# Patient Record
Sex: MALE | Race: WHITE | NOT HISPANIC OR LATINO | Employment: UNEMPLOYED | URBAN - METROPOLITAN AREA
[De-identification: names, ages, dates, MRNs, and addresses within clinical notes are randomized per-mention and may not be internally consistent; named-entity substitution may affect disease eponyms.]

---

## 2020-01-06 ENCOUNTER — OFFICE VISIT (OUTPATIENT)
Dept: ENDOCRINOLOGY | Facility: CLINIC | Age: 50
End: 2020-01-06

## 2020-01-06 VITALS
DIASTOLIC BLOOD PRESSURE: 84 MMHG | BODY MASS INDEX: 38.12 KG/M2 | OXYGEN SATURATION: 98 % | SYSTOLIC BLOOD PRESSURE: 122 MMHG | HEART RATE: 65 BPM | WEIGHT: 297 LBS | HEIGHT: 74 IN

## 2020-01-06 DIAGNOSIS — E11.9 TYPE 2 DIABETES MELLITUS WITHOUT COMPLICATION, WITHOUT LONG-TERM CURRENT USE OF INSULIN (HCC): Primary | ICD-10-CM

## 2020-01-06 DIAGNOSIS — E89.0 POSTABLATIVE HYPOTHYROIDISM: ICD-10-CM

## 2020-01-06 DIAGNOSIS — E04.2 MULTIPLE THYROID NODULES: ICD-10-CM

## 2020-01-06 PROBLEM — M19.90 ARTHRITIS: Status: ACTIVE | Noted: 2017-10-11

## 2020-01-06 PROBLEM — E78.2 MIXED HYPERLIPIDEMIA: Status: ACTIVE | Noted: 2020-01-06

## 2020-01-06 PROBLEM — I10 HYPERTENSIVE DISORDER: Status: ACTIVE | Noted: 2017-10-11

## 2020-01-06 LAB
T4 FREE SERPL-MCNC: 1.3 NG/DL (ref 0.93–1.7)
TSH SERPL DL<=0.05 MIU/L-ACNC: 0.14 UIU/ML (ref 0.27–4.2)

## 2020-01-06 PROCEDURE — 84443 ASSAY THYROID STIM HORMONE: CPT | Performed by: INTERNAL MEDICINE

## 2020-01-06 PROCEDURE — 99214 OFFICE O/P EST MOD 30 MIN: CPT | Performed by: INTERNAL MEDICINE

## 2020-01-06 PROCEDURE — 84439 ASSAY OF FREE THYROXINE: CPT | Performed by: INTERNAL MEDICINE

## 2020-01-06 RX ORDER — SIMVASTATIN 40 MG
TABLET ORAL
COMMUNITY
End: 2020-06-18 | Stop reason: SDUPTHER

## 2020-01-06 RX ORDER — METFORMIN HYDROCHLORIDE 500 MG/1
1 TABLET, EXTENDED RELEASE ORAL 2 TIMES DAILY
COMMUNITY
End: 2020-01-06 | Stop reason: SDUPTHER

## 2020-01-06 RX ORDER — LISINOPRIL 10 MG/1
TABLET ORAL
COMMUNITY

## 2020-01-06 RX ORDER — METOPROLOL SUCCINATE 100 MG/1
TABLET, EXTENDED RELEASE ORAL
COMMUNITY

## 2020-01-06 RX ORDER — GABAPENTIN 300 MG/1
CAPSULE ORAL 2 TIMES DAILY
COMMUNITY

## 2020-01-06 RX ORDER — DICLOFENAC SODIUM 75 MG/1
75 TABLET, DELAYED RELEASE ORAL 2 TIMES DAILY PRN
COMMUNITY
Start: 2019-11-27

## 2020-01-06 RX ORDER — METFORMIN HYDROCHLORIDE 500 MG/1
500 TABLET, EXTENDED RELEASE ORAL 2 TIMES DAILY
Qty: 180 TABLET | Refills: 3 | Status: SHIPPED | OUTPATIENT
Start: 2020-01-06 | End: 2020-06-18 | Stop reason: SDUPTHER

## 2020-01-06 RX ORDER — OMEPRAZOLE 20 MG/1
20 CAPSULE, DELAYED RELEASE ORAL DAILY
COMMUNITY
End: 2020-06-18 | Stop reason: SDUPTHER

## 2020-01-06 RX ORDER — QUETIAPINE 150 MG/1
150 TABLET, FILM COATED, EXTENDED RELEASE ORAL EVERY EVENING
COMMUNITY
Start: 2019-12-27

## 2020-01-06 RX ORDER — LEVOTHYROXINE SODIUM 0.07 MG/1
75 TABLET ORAL DAILY
COMMUNITY
Start: 2019-12-13 | End: 2020-01-07 | Stop reason: SDUPTHER

## 2020-01-06 RX ORDER — DULOXETIN HYDROCHLORIDE 30 MG/1
CAPSULE, DELAYED RELEASE ORAL DAILY
COMMUNITY
Start: 2019-12-31 | End: 2022-04-18

## 2020-01-06 NOTE — PROGRESS NOTES
Chief Complaint   Patient presents with   • Thyroid Problem     Previous pt here today for follow up   • Diabetes     Last A1C done with PCP in November 6.1        HPI   Cameron Courtney is a 49 y.o. male had concerns including Thyroid Problem (Previous pt here today for follow up) and Diabetes (Last A1C done with PCP in November 6.1).    He is monitoring blood sugars 1-2 times per week.  All BG is 100s.  Current medications for diabetes include metformin 500 mg twice daily.    PCP checked A1c in November and it was 6.1.    He has a history of hyperthyroidism and underwent radioactive iodine ablation in 2018.  Records have been requested.  He is now on levothyroxine 75 mcg daily and is due for repeat labs today.    Patient has a thyroid nodule which has been biopsied and most recently was benign.  Last thyroid ultrasound he thinks was in June.    The following portions of the patient's history were reviewed and updated as appropriate: allergies, current medications, past family history, past medical history, past social history, past surgical history and problem list.    Review of Systems   Constitutional: Positive for chills, fatigue and unexpected weight gain.   HENT: Positive for sore throat. Negative for congestion.    Eyes: Positive for pain, redness and itching.   Respiratory: Positive for shortness of breath. Negative for cough.    Cardiovascular: Positive for palpitations and leg swelling.   Gastrointestinal: Positive for abdominal pain, constipation, nausea, vomiting, GERD and indigestion.   Endocrine: Negative.    Genitourinary: Positive for dysuria, flank pain, urgency and urinary incontinence.   Musculoskeletal: Positive for arthralgias, back pain, joint swelling and myalgias.   Skin: Positive for dry skin. Negative for rash.   Allergic/Immunologic: Negative.    Neurological: Positive for dizziness, weakness, numbness and headache.   Hematological: Negative.    Psychiatric/Behavioral: Positive for  "decreased concentration, sleep disturbance, depressed mood and stress. The patient is nervous/anxious.       ROS was reviewed and verified. All other ROS negative.    /84 (BP Location: Left arm, Patient Position: Sitting, Cuff Size: Adult)   Pulse 65   Ht 188 cm (74\")   Wt 135 kg (297 lb)   SpO2 98%   BMI 38.13 kg/m²      Physical Exam     Constitutional:  well developed; well nourished  no acute distress   ENT/Thyroid: Mild thyromegaly, no distinctly palpable nodules   Eyes: EOM intact  Conjunctiva: clear   Respiratory:  breathing is unlabored  clear to auscultation bilaterally   Cardiovascular:  regular rate and rhythm, S1, S2 normal, no murmur, click, rub or gallop   Chest:  Not performed.   Abdomen: Not performed.   : Not performed.   Musculoskeletal: negative findings:  ROM of all joints is normal, no deformities present   Skin: dry and warm   Neuro: normal without focal findings, mental status, speech normal, alert and oriented x3 and RAFAEL   Psych: oriented to time, place and person, mood and affect are within normal limits       Assessment and Plan    Cameron was seen today for thyroid problem and diabetes.    Diagnoses and all orders for this visit:    Type 2 diabetes mellitus without complication, without long-term current use of insulin (CMS/[facility])  Controlled with A1c 6.1.  PCP monitoring A1c routinely.    Continue metformin 500 mg twice daily.  Labs are up-to-date yearly.  Ophtho exam should be updated yearly.   -     metFORMIN ER (GLUCOPHAGE-XR) 500 MG 24 hr tablet; Take 1 tablet by mouth 2 (Two) Times a Day.    Postablative hypothyroidism  Clinically euthyroid on levothyroxine 75 mcg daily.  Check TFTs today.  -     TSH  -     T4, Free    Multiple thyroid nodules  History of benign FNA.  Records from prior office including past ultrasound and FNA results have been requested.  Thyroid ultrasound due at follow-up.       Return in about 5 months (around 6/6/2020) for next scheduled follow up " with thyroid ultrasouond. The patient was instructed to contact the clinic with any interval questions or concerns.    Juli Ely, DO   Endocrinologist    Please note that portions of this document were completed with a voice recognition program. Efforts were made to edit the dictations, but occasionally words are mis-transcribed.

## 2020-01-07 DIAGNOSIS — E89.0 POSTABLATIVE HYPOTHYROIDISM: Primary | ICD-10-CM

## 2020-01-07 RX ORDER — LEVOTHYROXINE SODIUM 0.07 MG/1
TABLET ORAL
Qty: 14 TABLET | Refills: 5 | Status: SHIPPED | OUTPATIENT
Start: 2020-01-07 | End: 2020-06-18 | Stop reason: SDUPTHER

## 2020-01-07 RX ORDER — LEVOTHYROXINE SODIUM 0.05 MG/1
TABLET ORAL
Qty: 18 TABLET | Refills: 5 | Status: SHIPPED | OUTPATIENT
Start: 2020-01-07 | End: 2020-06-18 | Stop reason: SDUPTHER

## 2020-06-18 ENCOUNTER — OFFICE VISIT (OUTPATIENT)
Dept: ENDOCRINOLOGY | Facility: CLINIC | Age: 50
End: 2020-06-18

## 2020-06-18 VITALS
HEART RATE: 71 BPM | SYSTOLIC BLOOD PRESSURE: 118 MMHG | BODY MASS INDEX: 38.2 KG/M2 | WEIGHT: 297.7 LBS | DIASTOLIC BLOOD PRESSURE: 82 MMHG | OXYGEN SATURATION: 98 % | HEIGHT: 74 IN

## 2020-06-18 DIAGNOSIS — E11.9 TYPE 2 DIABETES MELLITUS WITHOUT COMPLICATION, WITHOUT LONG-TERM CURRENT USE OF INSULIN (HCC): Primary | ICD-10-CM

## 2020-06-18 DIAGNOSIS — E89.0 POSTABLATIVE HYPOTHYROIDISM: ICD-10-CM

## 2020-06-18 DIAGNOSIS — E78.2 MIXED HYPERLIPIDEMIA: ICD-10-CM

## 2020-06-18 DIAGNOSIS — E04.1 SOLITARY THYROID NODULE: ICD-10-CM

## 2020-06-18 LAB
GLUCOSE BLDC GLUCOMTR-MCNC: 183 MG/DL (ref 70–130)
HBA1C MFR BLD: 7.6 %

## 2020-06-18 PROCEDURE — 82947 ASSAY GLUCOSE BLOOD QUANT: CPT | Performed by: INTERNAL MEDICINE

## 2020-06-18 PROCEDURE — 99214 OFFICE O/P EST MOD 30 MIN: CPT | Performed by: INTERNAL MEDICINE

## 2020-06-18 PROCEDURE — 76536 US EXAM OF HEAD AND NECK: CPT | Performed by: INTERNAL MEDICINE

## 2020-06-18 PROCEDURE — 10005 FNA BX W/US GDN 1ST LES: CPT | Performed by: INTERNAL MEDICINE

## 2020-06-18 PROCEDURE — 83036 HEMOGLOBIN GLYCOSYLATED A1C: CPT | Performed by: INTERNAL MEDICINE

## 2020-06-18 RX ORDER — SIMVASTATIN 40 MG
40 TABLET ORAL DAILY
Qty: 90 TABLET | Refills: 1 | Status: SHIPPED | OUTPATIENT
Start: 2020-06-18 | End: 2020-09-21 | Stop reason: SDUPTHER

## 2020-06-18 RX ORDER — LEVOTHYROXINE SODIUM 0.07 MG/1
TABLET ORAL
Qty: 42 TABLET | Refills: 3 | Status: SHIPPED | OUTPATIENT
Start: 2020-06-18 | End: 2021-03-11

## 2020-06-18 RX ORDER — LEVOTHYROXINE SODIUM 0.05 MG/1
TABLET ORAL
Qty: 54 TABLET | Refills: 3 | Status: SHIPPED | OUTPATIENT
Start: 2020-06-18 | End: 2021-03-11

## 2020-06-18 RX ORDER — METFORMIN HYDROCHLORIDE 500 MG/1
500-1000 TABLET, EXTENDED RELEASE ORAL 2 TIMES DAILY
Qty: 270 TABLET | Refills: 3 | Status: SHIPPED | OUTPATIENT
Start: 2020-06-18 | End: 2020-06-22 | Stop reason: ALTCHOICE

## 2020-06-18 RX ORDER — NICOTINE POLACRILEX 4 MG/1
1 GUM, CHEWING ORAL EVERY MORNING
COMMUNITY
Start: 2020-06-05

## 2020-06-18 NOTE — PROGRESS NOTES
Chief Complaint   Patient presents with   • Diabetes     Type 2 Diabetes   • Thyroid Problem     + US        HPI   Cameron Courtney is a 49 y.o. male had concerns including Diabetes (Type 2 Diabetes) and Thyroid Problem (+ US).    He is checking blood sugars once every 2 to 3 weeks.  Reports BG's have been in the 100s.  His A1c has trended up to 7.6 from 6.1.  Patient has been eating too many sweets (ice cream).  Currently he is on metformin 500 mg twice daily.  Has not tolerated maximal doses of metformin in the past but is willing to try 3 tabs per day.    After his last visit I changed his levothyroxine dose to 75 mcg 3 times per week and 50 mcg 4 times per week.  He had the repeat labs drawn however we did not receive the report.  PCP checked labs recently and these will be requested.    Patient had thyroid ablation for hyperthyroidism about a year ago.    Last thyroid ultrasound was completed on 5/30/2019 and showed the left lobe nodule measuring 1.4 x 1.4 x 1.1 cm when the nodule appeared solid with no calcifications of some seen in the anterior thyroid.  The nodule had previously decreased in size and had a cystic component.  Repeat ultrasound was completed in the office today.    The following portions of the patient's history were reviewed and updated as appropriate: allergies, current medications, past family history, past medical history, past social history, past surgical history and problem list.    Review of Systems   Constitutional: Negative.    HENT: Negative.    Eyes: Negative.    Respiratory: Negative.    Cardiovascular: Negative.    Gastrointestinal: Negative.    Endocrine: Negative.    Genitourinary: Negative.    Musculoskeletal: Negative.    Skin: Negative.    Allergic/Immunologic: Negative.    Neurological: Negative.    Hematological: Negative.    Psychiatric/Behavioral: Negative.       ROS was reviewed and verified. All other ROS negative.    /82 (BP Location: Left arm, Patient Position:  "Sitting, Cuff Size: Large Adult)   Pulse 71   Ht 188 cm (74\")   Wt 135 kg (297 lb 11.2 oz)   SpO2 98%   BMI 38.22 kg/m²      Physical Exam     Constitutional:  well developed; well nourished  no acute distress  obese - Body mass index is 38.22 kg/m².   ENT/Thyroid: no thyromegaly  no palpable nodules   Eyes: EOM intact  Conjunctiva: clear   Respiratory:  breathing is unlabored  clear to auscultation bilaterally   Cardiovascular:  regular rate and rhythm, S1, S2 normal, no murmur, click, rub or gallop   Chest:  Not performed.   Abdomen: Not performed.   : Not performed.   Musculoskeletal: negative findings:  ROM of all joints is normal, no deformities present   Skin: dry and warm   Neuro: normal without focal findings and mental status, speech normal, alert and oriented x3   Psych: oriented to time, place and person, mood and affect are within normal limits     HbA1c:  Lab Results   Component Value Date    HGBA1C 7.6 06/18/2020     Glucose:    Lab Results   Component Value Date    POCGLU 183 (A) 06/18/2020     TSH:  Lab Results   Component Value Date    TSH 0.145 (L) 01/06/2020       Thyroid Ultrasound 06/18/20    Indication: Thyroid nodule  Comparison Imaging: Ultrasound report from May 2019  Clinical History:  Left thyroid nodule with prior benign FNA    Real time high resolution imaging of the thyroid gland was performed in transverse and longitudinal planes. Previous imaging reports were reviewed if available and compared to the current to assess stability.     Lobes:  The right lobe measured 3.0 cm L x 1.3 cm AP x 1.2 cm in TV dimension.    The isthmus measured 0.1 cm in thickness.    The left thyroid lobe measured 3.3 cm L x 2.0 cm AP x 1.8 cm in TV dimension.    Thyroid gland is heterogeneous and contains single left nodule.    Nodule 1   located in the left mid lobe and measures 2.1 x 1.6 x 1.7 cm (L X AP X TV).  This nodule is solid, heterogeneous, hypoechoic with poorly defined margins, and Grade II " vascularity on Color Flow Doppler.  It has both macro and microcalcifications.    No pathologic lymph nodes were seen.    Impression:  Left thyroid nodule has increased in size to 2.1 x 1.6 x 1.7 cm from last ultrasound in May 2019 when it measured 1.4 x 1.4 x 1.1 cm.  The remainder of the thyroid is diminutive and hypoechoic, consistent with history of radioactive iodine treatment for Graves' disease.    Recommendation:  Repeat FNA of the left thyroid nodule.      Thyroid Biopsy Procedure Note      Indications: Thyroid Nodule    Anesthesia: Ethyl chloride spray    Procedure Details   The procedure, risks and complications were discussed in detail with the patient (including but not limited to infection, bleeding), and the patient signed consent for the procedure.    The neck was prepped in sterile fashion.  Biopsy site: left 2.1 cm nodule.  With ultrasound guidance of needle localization,   4  aspirates were obtained with sterile 27 g. needles.  8 slides were prepared with both air drying and immediate cytology fixative.    A single container with 20 ml of cytology fixative was also used to collect needle and syringe washings.   Specimens were sent to pathology.   An additional specimen was collected for molecular analysis and will be sent in the event of indeterminate biopsy.     The patient tolerated the procedure without difficulty or complications.           Assessment and Plan    Cameron was seen today for diabetes and thyroid problem.    Diagnoses and all orders for this visit:    Type 2 diabetes mellitus without complication, without long-term current use of insulin (CMS/[facility])  Uncontrolled with A1c up to 7.6.  Patient reports this is due to dietary indiscretion.  Increase metformin to 3 tabs daily.  Patient has not tolerated 4 tabs daily in the past.  He also indicates he will change his diet to improve BG's.  Check BG several times per week.  Labs updated by PCP and these will be requested.  Ophtho exam is  scheduled 2 weeks from now.  Monofilament will be checked at follow-up visit.  -     POC Glucose  -     POC Glycosylated Hemoglobin (Hb A1C)  -     metFORMIN ER (GLUCOPHAGE-XR) 500 MG 24 hr tablet; Take 1-2 tablets by mouth 2 (Two) Times a Day. 1 tab AM and 2 tabs PM    Solitary thyroid nodule  See ultrasound report as above.  Left lobe nodule has increased in size to 2.1 cm since prior ultrasound. FNA completed in the office today.  Await pathology results.  -     US Thyroid    Postablative hypothyroidism  Status post GALLARDO ablation a few years ago for Graves' disease.  Clinically euthyroid on levothyroxine 50 mcg 4 days/week and 75 mcg 3 days/week.  Request labs checked by PCP recently.  -     levothyroxine (Synthroid) 50 MCG tablet; Take 75 mcg tabs 3 days per week and 50 mcg tabs 4 days per week  -     levothyroxine (SYNTHROID, LEVOTHROID) 75 MCG tablet; Take 75 mcg tabs 3 days per week and 50 mcg tabs 4 days per week    Mixed hyperlipidemia  Request labs from PCP.  Refill of simvastatin sent.  -     simvastatin (ZOCOR) 40 MG tablet; Take 1 tablet by mouth Daily.         **Addendum 6/22/2020:    FNA results showed atypical follicular lesion of undetermined significance.  I notified the patient and discussed option for Afirma molecular analysis vs. endocrine surgery consultation and he prefers endocrine surgery consultation.  Referral will be placed.    Return in about 3 months (around 9/18/2020). The patient was instructed to contact the clinic with any interval questions or concerns.    Juli Ely, DO   Endocrinologist    Please note that portions of this document were completed with a voice recognition program. Efforts were made to edit the dictations, but occasionally words are mis-transcribed.

## 2020-06-22 ENCOUNTER — TELEPHONE (OUTPATIENT)
Dept: ENDOCRINOLOGY | Facility: CLINIC | Age: 50
End: 2020-06-22

## 2020-06-22 DIAGNOSIS — E11.9 TYPE 2 DIABETES MELLITUS WITHOUT COMPLICATION, WITHOUT LONG-TERM CURRENT USE OF INSULIN (HCC): Primary | ICD-10-CM

## 2020-06-22 NOTE — TELEPHONE ENCOUNTER
Pharmacy sent fax stating Metformin was recalled - they would like a new script for Metformin  mg or plain 500 mg. Please advise. Thanks!

## 2020-07-28 ENCOUNTER — TELEPHONE (OUTPATIENT)
Dept: ENDOCRINOLOGY | Facility: CLINIC | Age: 50
End: 2020-07-28

## 2020-07-28 NOTE — TELEPHONE ENCOUNTER
----- Message from Juli Ely DO sent at 6/18/2020 12:04 PM EDT -----  Regarding: labs from PCP  Please request labs from last year from PCP Thank you!

## 2020-09-21 ENCOUNTER — OFFICE VISIT (OUTPATIENT)
Dept: ENDOCRINOLOGY | Facility: CLINIC | Age: 50
End: 2020-09-21

## 2020-09-21 VITALS
WEIGHT: 292.8 LBS | HEIGHT: 74 IN | HEART RATE: 63 BPM | TEMPERATURE: 96.7 F | OXYGEN SATURATION: 98 % | BODY MASS INDEX: 37.58 KG/M2 | RESPIRATION RATE: 16 BRPM | SYSTOLIC BLOOD PRESSURE: 124 MMHG | DIASTOLIC BLOOD PRESSURE: 82 MMHG

## 2020-09-21 DIAGNOSIS — E66.01 CLASS 2 SEVERE OBESITY DUE TO EXCESS CALORIES WITH SERIOUS COMORBIDITY AND BODY MASS INDEX (BMI) OF 37.0 TO 37.9 IN ADULT (HCC): ICD-10-CM

## 2020-09-21 DIAGNOSIS — E78.2 MIXED HYPERLIPIDEMIA: ICD-10-CM

## 2020-09-21 DIAGNOSIS — E11.69 TYPE 2 DIABETES MELLITUS WITH OTHER SPECIFIED COMPLICATION, WITHOUT LONG-TERM CURRENT USE OF INSULIN (HCC): Primary | ICD-10-CM

## 2020-09-21 DIAGNOSIS — E89.0 POSTOPERATIVE HYPOTHYROIDISM: ICD-10-CM

## 2020-09-21 PROBLEM — E04.2 MULTIPLE THYROID NODULES: Status: RESOLVED | Noted: 2020-01-06 | Resolved: 2020-09-21

## 2020-09-21 PROBLEM — Z98.890 H/O THYROIDECTOMY: Status: ACTIVE | Noted: 2020-09-21

## 2020-09-21 PROBLEM — Z90.89 H/O THYROIDECTOMY: Status: ACTIVE | Noted: 2020-09-21

## 2020-09-21 LAB
GLUCOSE BLDC GLUCOMTR-MCNC: 217 MG/DL (ref 70–130)
HBA1C MFR BLD: 8.5 %

## 2020-09-21 PROCEDURE — 83036 HEMOGLOBIN GLYCOSYLATED A1C: CPT | Performed by: INTERNAL MEDICINE

## 2020-09-21 PROCEDURE — 82947 ASSAY GLUCOSE BLOOD QUANT: CPT | Performed by: INTERNAL MEDICINE

## 2020-09-21 PROCEDURE — 99214 OFFICE O/P EST MOD 30 MIN: CPT | Performed by: INTERNAL MEDICINE

## 2020-09-21 RX ORDER — SIMVASTATIN 40 MG
40 TABLET ORAL DAILY
Qty: 90 TABLET | Refills: 1 | Status: SHIPPED | OUTPATIENT
Start: 2020-09-21 | End: 2021-03-11 | Stop reason: SDUPTHER

## 2020-09-21 NOTE — PROGRESS NOTES
"Chief Complaint   Patient presents with   • Diabetes   • Hypothyroidism        HPI   Cameron Courtney is a 50 y.o. male had concerns including Diabetes and Hypothyroidism.    He is checking blood sugars 0 times per day.  Current medications for diabetes include metformin 3 pills daily.  A1c today is up to 8.5 from 7.6 and BG in the office is 217.  Indicates this is from eating ice cream too many times per week and portions are too large.     He underwent complete thyroidectomy with Dr. Bambi Boykin on 8/4/20. Reports some hypocalcemia issues postoperatively.   Has a telemedicine visit with her this week and labs done at [facility].  Notes more arthralgias since surgery. Takes Tums PRN.     Is on simvastatin 40 mg daily.  We do not have any records from PCP from last labs.  These will again be requested.    The following portions of the patient's history were reviewed and updated as appropriate: allergies, current medications, past family history, past medical history, past social history, past surgical history and problem list.    Review of Systems   Constitutional: Positive for chills and fatigue.   HENT: Negative.    Eyes: Negative.    Respiratory: Negative.    Cardiovascular: Negative.    Gastrointestinal: Negative.    Endocrine: Negative.    Genitourinary: Negative.    Musculoskeletal: Positive for arthralgias and myalgias.   Skin: Negative.    Allergic/Immunologic: Negative.    Neurological: Negative.    Hematological: Negative.    Psychiatric/Behavioral: Negative.       ROS was reviewed and verified. All other ROS negative.    /82   Pulse 63   Temp 96.7 °F (35.9 °C) (Infrared)   Resp 16   Ht 188 cm (74.02\")   Wt 133 kg (292 lb 12.8 oz)   SpO2 98%   BMI 37.58 kg/m²      Physical Exam  Cardiovascular:      Pulses:           Dorsalis pedis pulses are 2+ on the right side and 2+ on the left side.   Feet:      Right foot:      Skin integrity: Skin integrity normal.      Left foot:      Skin " integrity: Skin integrity normal.      Comments: Monofilament 1/5 bilaterally       Constitutional:  well developed; well nourished  no acute distress  obese - Body mass index is 37.58 kg/m².   ENT/Thyroid: surgically absent  no palpable lymphadenopathy   Eyes: EOM intact  Conjunctiva: clear   Respiratory:  breathing is unlabored  clear to auscultation bilaterally   Cardiovascular:  regular rate and rhythm, S1, S2 normal, no murmur, click, rub or gallop   Chest:  Not performed.   Abdomen: Not performed.   : Not performed.   Musculoskeletal: negative findings:  ROM of all joints is normal, no deformities present   Skin: dry and warm   Neuro: normal without focal findings and mental status, speech normal, alert and oriented x3   Psych: oriented to time, place and person, mood and affect are within normal limits     HbA1c:  Lab Results   Component Value Date    HGBA1C 8.5 09/21/2020    HGBA1C 7.6 06/18/2020     Glucose:    Lab Results   Component Value Date    POCGLU 217 (A) 09/21/2020     TSH:  Lab Results   Component Value Date    TSH 0.145 (L) 01/06/2020       Assessment and Plan    Cameron was seen today for diabetes and hypothyroidism.    Diagnoses and all orders for this visit:    Type 2 diabetes mellitus with other complication, without long-term current use of insulin (CMS/[facility])  Uncontrolled with A1c up to 8.5 and glucose in the office 217.  Complicated by neuropathy.  Increase metformin to 2 tabs twice daily from 3 pills total daily.  This may not be enough depending on his ability to make significant dietary changes.  Check BG's twice daily and if BG's are trending greater than 180 consistently, call the office of additional medications will be needed.  Labs have been requested from PCPs office.  Ophtho exam is up-to-date from a few months ago. Monofilament updated today.   -     POC Glucose, Blood  -     POC Glycosylated Hemoglobin (Hb A1C)  -     metFORMIN (Glucophage) 500 MG tablet; Take 2 tablets by  mouth 2 (Two) Times a Day With Meals.    Postoperative hypothyroidism  Status post thyroidectomy August 2020 for thyroid nodule with FNA showing atypical follicular lesion of undetermined significance.  Patient reports final pathology was benign.  He is following with Dr. Malgorzata Boykin.   Currently on levothyroxine 50 mcg 4 days per week and 75 mcg 3 days per week. TFTs were checked last week and he will see Dr. Boykin this week.   Records have been requested.     Mixed hyperlipidemia  Needs CMP and lipid yearly. Records have been requested.   Refill sent.   -     simvastatin (ZOCOR) 40 MG tablet; Take 1 tablet by mouth Daily.    Class 2 severe obesity due to excess calories with serious comorbidity and body mass index (BMI) of 37.0 to 37.9 in adult (CMS/[facility])  BMI 37.6.   Weight loss is necessary. Discussed dietary modifications, decrease carbs and calorie intake with goal for weight loss.        Return in about 4 months (around 1/21/2021) for next scheduled follow up. The patient was instructed to contact the clinic with any interval questions or concerns.    Juli Ely, DO   Endocrinologist    Please note that portions of this document were completed with a voice recognition program. Efforts were made to edit the dictations, but occasionally words are mis-transcribed.

## 2021-03-11 ENCOUNTER — TELEPHONE (OUTPATIENT)
Dept: ENDOCRINOLOGY | Facility: CLINIC | Age: 51
End: 2021-03-11

## 2021-03-11 ENCOUNTER — OFFICE VISIT (OUTPATIENT)
Dept: ENDOCRINOLOGY | Facility: CLINIC | Age: 51
End: 2021-03-11

## 2021-03-11 ENCOUNTER — LAB (OUTPATIENT)
Dept: LAB | Facility: HOSPITAL | Age: 51
End: 2021-03-11

## 2021-03-11 VITALS
HEIGHT: 74 IN | TEMPERATURE: 97.8 F | WEIGHT: 291.6 LBS | SYSTOLIC BLOOD PRESSURE: 128 MMHG | BODY MASS INDEX: 37.42 KG/M2 | DIASTOLIC BLOOD PRESSURE: 80 MMHG

## 2021-03-11 DIAGNOSIS — E89.0 POSTOPERATIVE HYPOTHYROIDISM: ICD-10-CM

## 2021-03-11 DIAGNOSIS — E78.2 MIXED HYPERLIPIDEMIA: ICD-10-CM

## 2021-03-11 DIAGNOSIS — E89.0 POSTABLATIVE HYPOTHYROIDISM: ICD-10-CM

## 2021-03-11 DIAGNOSIS — E11.69 TYPE 2 DIABETES MELLITUS WITH OTHER SPECIFIED COMPLICATION, WITHOUT LONG-TERM CURRENT USE OF INSULIN (HCC): Primary | ICD-10-CM

## 2021-03-11 LAB
ALBUMIN UR-MCNC: 1.3 MG/DL
CREAT UR-MCNC: 214.5 MG/DL
EXPIRATION DATE: NORMAL
HBA1C MFR BLD: 6.4 %
Lab: NORMAL
MICROALBUMIN/CREAT UR: 6.1 MG/G

## 2021-03-11 PROCEDURE — 99214 OFFICE O/P EST MOD 30 MIN: CPT | Performed by: INTERNAL MEDICINE

## 2021-03-11 PROCEDURE — 82043 UR ALBUMIN QUANTITATIVE: CPT | Performed by: INTERNAL MEDICINE

## 2021-03-11 PROCEDURE — 82570 ASSAY OF URINE CREATININE: CPT | Performed by: INTERNAL MEDICINE

## 2021-03-11 PROCEDURE — 83036 HEMOGLOBIN GLYCOSYLATED A1C: CPT | Performed by: INTERNAL MEDICINE

## 2021-03-11 PROCEDURE — 84439 ASSAY OF FREE THYROXINE: CPT | Performed by: INTERNAL MEDICINE

## 2021-03-11 PROCEDURE — 84443 ASSAY THYROID STIM HORMONE: CPT | Performed by: INTERNAL MEDICINE

## 2021-03-11 RX ORDER — LEVOTHYROXINE SODIUM 0.07 MG/1
TABLET ORAL
Qty: 42 TABLET | Refills: 3
Start: 2021-03-11 | End: 2021-03-12 | Stop reason: SDUPTHER

## 2021-03-11 RX ORDER — SIMVASTATIN 40 MG
40 TABLET ORAL DAILY
Qty: 90 TABLET | Refills: 3 | Status: SHIPPED | OUTPATIENT
Start: 2021-03-11 | End: 2021-03-15

## 2021-03-11 RX ORDER — METFORMIN HYDROCHLORIDE 500 MG/1
1000 TABLET, EXTENDED RELEASE ORAL 2 TIMES DAILY
Qty: 360 TABLET | Refills: 3 | Status: SHIPPED | OUTPATIENT
Start: 2021-03-11 | End: 2022-04-18 | Stop reason: SDUPTHER

## 2021-03-11 RX ORDER — LEVOTHYROXINE SODIUM 0.05 MG/1
TABLET ORAL
Qty: 54 TABLET | Refills: 3
Start: 2021-03-11 | End: 2021-03-12

## 2021-03-11 RX ORDER — METFORMIN HYDROCHLORIDE 500 MG/1
2 TABLET, EXTENDED RELEASE ORAL 2 TIMES DAILY
COMMUNITY
Start: 2021-02-22 | End: 2021-03-11 | Stop reason: SDUPTHER

## 2021-03-11 NOTE — PROGRESS NOTES
"Chief Complaint   Patient presents with   • Follow-up   • Thyroid Problem   • Diabetes        HPI   Cameron Courtney is a 50 y.o. male had concerns including Follow-up, Thyroid Problem, and Diabetes.      PCP checked labs about three months ago and TSH was around 10.  He is currently on levothyroxine 50 mcg 2 days/week and 75 mcg 5 days/week.    Pt feels somewhat sluggish.  Denies any other new concerns today.    A1c is improved today to 6.4 from 8.5 after dietary changes and increasing metformin.  He checks his blood sugars 2 times per week and they tend to run in the 120s to 130s.      The following portions of the patient's history were reviewed and updated as appropriate: allergies, current medications, past family history, past medical history, past social history, past surgical history and problem list.    Review of Systems   Constitutional: Positive for fatigue.   Endocrine: Negative.         /80   Temp 97.8 °F (36.6 °C) (Infrared)   Ht 188 cm (74\")   Wt 132 kg (291 lb 9.6 oz)   BMI 37.44 kg/m²      Physical Exam  Vitals reviewed.   Constitutional:       Appearance: Normal appearance. He is obese.   Eyes:      Extraocular Movements: Extraocular movements intact.   Pulmonary:      Effort: Pulmonary effort is normal.   Neurological:      Mental Status: He is alert.   Psychiatric:         Mood and Affect: Mood normal.           Labs/Imaging    HbA1c:  Lab Results   Component Value Date    HGBA1C 6.4 03/11/2021    HGBA1C 8.5 09/21/2020     TSH:  Lab Results   Component Value Date    TSH 0.145 (L) 01/06/2020       Assessment and Plan    Diagnoses and all orders for this visit:    1. Type 2 diabetes mellitus with other specified complication, without long-term current use of insulin (CMS/[facility]) (Primary)  Controlled with A1c down to 6.4 from 8.5 at his last visit.  Complicated by neuropathy.    Continue metformin 2 tabs twice daily.  Labs updated by PCP and will be requested.  Check urine microalbumin to " creatinine ratio today.  Monofilament updated September 2020.  Ophthoexam up-to-date within the last year -summer 2020.  -     POC Glycosylated Hemoglobin (Hb A1C)  -     Microalbumin / Creatinine Urine Ratio - Urine, Clean Catch  -     metFORMIN ER (GLUCOPHAGE-XR) 500 MG 24 hr tablet; Take 2 tablets by mouth 2 (two) times a day.  Dispense: 360 tablet; Refill: 3    2. Postoperative hypothyroidism  Status post complete thyroidectomy 8/4/2020 of atypical lesion of undetermined significance.  Prior to this patient had Graves' disease status post GALLARDO.  Ultimately benign surgical pathology.  TSH has been elevated and patient complains of fatigue.  Recheck TFTs today and titrate levothyroxine.    He is currently on levothyroxine 50 mcg 2 days/week and 75 mcg 5 days/week.  -     TSH  -     T4, Free    3. Mixed hyperlipidemia  Refill simvastatin.  CMP and lipid should be checked yearly.  Request labs from PCP.  -     simvastatin (ZOCOR) 40 MG tablet; Take 1 tablet by mouth Daily.  Dispense: 90 tablet; Refill: 3         Return in about 3 months (around 6/11/2021) for next scheduled follow up. The patient was instructed to contact the clinic with any interval questions or concerns.    Juli Ely, DO   Endocrinologist    Please note that portions of this document were completed with a voice recognition program. Efforts were made to edit the dictations, but occasionally words are mis-transcribed.

## 2021-03-12 DIAGNOSIS — E89.0 POSTABLATIVE HYPOTHYROIDISM: ICD-10-CM

## 2021-03-12 LAB
T4 FREE SERPL-MCNC: 1.1 NG/DL (ref 0.93–1.7)
TSH SERPL DL<=0.05 MIU/L-ACNC: 7.76 UIU/ML (ref 0.27–4.2)

## 2021-03-12 RX ORDER — LEVOTHYROXINE SODIUM 0.07 MG/1
75 TABLET ORAL DAILY
Qty: 90 TABLET | Refills: 1 | Status: SHIPPED | OUTPATIENT
Start: 2021-03-12 | End: 2021-07-08 | Stop reason: SDUPTHER

## 2021-03-15 ENCOUNTER — TELEPHONE (OUTPATIENT)
Dept: ENDOCRINOLOGY | Facility: CLINIC | Age: 51
End: 2021-03-15

## 2021-03-15 DIAGNOSIS — E78.2 MIXED HYPERLIPIDEMIA: Primary | ICD-10-CM

## 2021-03-15 RX ORDER — ATORVASTATIN CALCIUM 40 MG/1
40 TABLET, FILM COATED ORAL DAILY
Qty: 90 TABLET | Refills: 3 | Status: SHIPPED | OUTPATIENT
Start: 2021-03-15 | End: 2021-07-08

## 2021-03-15 NOTE — TELEPHONE ENCOUNTER
Please let the pt know I received his labs and his cholesterol levels are higher than goal. I would like to change him from simvastatin to atorvastatin - which should lower the cholesterol better. I'll send a script to his pharmacy.     Labs from 3/11/2021 CMP with creatinine 1.16, GFR 73, AST 43 with an upper limit of 40, ALT 62 with an upper limit of 44  Labs 12/1/2020 CMP with creatinine 1.14, GFR 74, AST 57, ALT 92, total cholesterol 172, triglycerides 157, HDL 40, , TSH 10.8, random urine albumin 17.6

## 2021-06-16 ENCOUNTER — OFFICE VISIT (OUTPATIENT)
Dept: ENDOCRINOLOGY | Facility: CLINIC | Age: 51
End: 2021-06-16

## 2021-06-16 VITALS
SYSTOLIC BLOOD PRESSURE: 112 MMHG | BODY MASS INDEX: 37.22 KG/M2 | DIASTOLIC BLOOD PRESSURE: 76 MMHG | HEIGHT: 74 IN | HEART RATE: 64 BPM | WEIGHT: 290 LBS

## 2021-06-16 DIAGNOSIS — E87.5 HYPERKALEMIA: ICD-10-CM

## 2021-06-16 DIAGNOSIS — E89.0 POSTABLATIVE HYPOTHYROIDISM: ICD-10-CM

## 2021-06-16 DIAGNOSIS — E78.2 MIXED HYPERLIPIDEMIA: ICD-10-CM

## 2021-06-16 DIAGNOSIS — Z99.89 OSA ON CPAP: ICD-10-CM

## 2021-06-16 DIAGNOSIS — G47.33 OSA ON CPAP: ICD-10-CM

## 2021-06-16 DIAGNOSIS — E89.0 POSTOPERATIVE HYPOTHYROIDISM: ICD-10-CM

## 2021-06-16 DIAGNOSIS — E11.69 TYPE 2 DIABETES MELLITUS WITH OTHER SPECIFIED COMPLICATION, WITHOUT LONG-TERM CURRENT USE OF INSULIN (HCC): Primary | ICD-10-CM

## 2021-06-16 LAB
EXPIRATION DATE: ABNORMAL
EXPIRATION DATE: NORMAL
GLUCOSE BLDC GLUCOMTR-MCNC: 141 MG/DL (ref 70–130)
HBA1C MFR BLD: 7.7 %
Lab: ABNORMAL
Lab: NORMAL

## 2021-06-16 PROCEDURE — 99214 OFFICE O/P EST MOD 30 MIN: CPT | Performed by: INTERNAL MEDICINE

## 2021-06-16 PROCEDURE — 83036 HEMOGLOBIN GLYCOSYLATED A1C: CPT | Performed by: INTERNAL MEDICINE

## 2021-06-16 PROCEDURE — 82947 ASSAY GLUCOSE BLOOD QUANT: CPT | Performed by: INTERNAL MEDICINE

## 2021-06-16 RX ORDER — EMPAGLIFLOZIN 25 MG/1
1 TABLET, FILM COATED ORAL DAILY
Qty: 30 TABLET | Refills: 6 | Status: SHIPPED | OUTPATIENT
Start: 2021-06-16 | End: 2021-09-27 | Stop reason: SDUPTHER

## 2021-06-16 NOTE — PROGRESS NOTES
"Chief Complaint   Patient presents with   • Diabetes   • Hypothyroidism          HPI   Cameron Courtney is a 50 y.o. male had concerns including Diabetes and Hypothyroidism.    He is checking blood sugars 1-2 times per week.   A1c is up to 7.7 from 6.4.     Current medications for diabetes include metformin 1000 mg BID. Takes an extra tab when BGs > 200.  Hasn't been on SGLT-2 inhibitor of GLP-1 agonist in the past.     C/o fatigue and brain fog. Wears CPAP nightly but feels like he isn't entering REM sleep. CPAP is about 10 years old.     The following portions of the patient's history were reviewed and updated as appropriate: allergies, current medications, past family history, past medical history, past social history, past surgical history and problem list.      Review of Systems   Constitutional: Positive for fatigue.   Cardiovascular: Negative for chest pain.   Endocrine:        See HPI   Musculoskeletal: Positive for arthralgias.   Psychiatric/Behavioral: Positive for sleep disturbance.        Physical Exam  Vitals reviewed.   Constitutional:       Appearance: Normal appearance. He is obese.      Comments: Body mass index is 37.23 kg/m².     Cardiovascular:      Rate and Rhythm: Normal rate.   Pulmonary:      Effort: Pulmonary effort is normal.   Neurological:      General: No focal deficit present.      Mental Status: He is alert. Mental status is at baseline.   Psychiatric:         Mood and Affect: Mood normal.         Behavior: Behavior normal.        /76 (BP Location: Right arm, Patient Position: Sitting, Cuff Size: Adult)   Pulse 64   Ht 188 cm (74\")   Wt 132 kg (290 lb)   BMI 37.23 kg/m²      Labs and imaging    HbA1c:  Lab Results   Component Value Date    HGBA1C 7.7 06/16/2021    HGBA1C 6.4 03/11/2021     Glucose:    Lab Results   Component Value Date    POCGLU 141 (A) 06/16/2021     Microalbumin:  Lab Results   Component Value Date    MALBCRERATIO 6.1 03/11/2021     TSH:  Lab Results "   Component Value Date    TSH 7.760 (H) 03/11/2021     Labs from 3/11/2021 CMP with creatinine 1.16, GFR 73, AST 43 with an upper limit of 40, ALT 62 with an upper limit of 44  Labs 12/1/2020 CMP with creatinine 1.14, GFR 74, AST 57, ALT 92, total cholesterol 172, triglycerides 157, HDL 40, , TSH 10.8, random urine albumin 17.6    Assessment and plan  Diagnoses and all orders for this visit:    1. Type 2 diabetes mellitus with other specified complication, without long-term current use of insulin (CMS/[facility]) (Primary)  Uncontrolled with hyperglycemia, A1c up to 7.7 from 6.4 at his last visit.  Due to dietary indiscretion.  Continue metformin 1000 mg twice daily.    Add Jardiance 25 mg daily.  Caution regarding possible UTI/yeast infection symptoms. Recommended GLP-1 agonist for more weight loss though pt declined due to injection and rybelsus appears to not be covered.  Check fasting labs next week.  Ophtho exam is up-to-date from July 2020 and pending appointment next month.  Monofilament updated from September 2020.  -     POC Glycosylated Hemoglobin (Hb A1C)  -     POC Glucose, Blood  -     Microalbumin / Creatinine Urine Ratio - Urine, Clean Catch; Future  -     CBC (No Diff); Future  -     Empagliflozin (Jardiance) 25 MG tablet; Take 25 mg by mouth Daily.  Dispense: 30 tablet; Refill: 6    2. Mixed hyperlipidemia  Changed to atorvastatin after his last visit his LDL was above goal at 103.  Repeat fasting CMP and lipid.  -     Comprehensive Metabolic Panel; Future  -     Lipid Panel; Future    3. Postoperative hypothyroidism  History of Graves' disease status post GALLARDO.  Patient had FNA of thyroid nodule that was atypical status post complete thyroidectomy August 4, 2020 with benign pathology.  Dose of levothyroxine was increased after his last visit for TSH 7.76.  Repeat TFTs and titrate levothyroxine if needed.  -     TSH; Future  -     T4, Free; Future    4. KNERICK on CPAP  KENRICK on CPAP.  Compliant with use.   Thinks machine is 10 years old and not functioning properly.  Complaining of fatigue and brain fog.  Follow-up with Dr. Morales.       Return in about 3 months (around 9/16/2021) for next scheduled follow up. The patient was instructed to contact the clinic with any interval questions or concerns.    Juli Ely, DO   Endocrinologist    Please note that portions of this document were completed with a voice recognition program. Efforts were made to edit the dictations, but occasionally words are mis-transcribed.

## 2021-07-08 ENCOUNTER — TELEPHONE (OUTPATIENT)
Dept: ENDOCRINOLOGY | Facility: CLINIC | Age: 51
End: 2021-07-08

## 2021-07-08 DIAGNOSIS — E87.5 HYPERKALEMIA: ICD-10-CM

## 2021-07-08 DIAGNOSIS — E78.2 MIXED HYPERLIPIDEMIA: Primary | ICD-10-CM

## 2021-07-08 DIAGNOSIS — E89.0 POSTABLATIVE HYPOTHYROIDISM: ICD-10-CM

## 2021-07-08 RX ORDER — ROSUVASTATIN CALCIUM 40 MG/1
40 TABLET, COATED ORAL DAILY
Qty: 90 TABLET | Refills: 2 | Status: SHIPPED | OUTPATIENT
Start: 2021-07-08 | End: 2021-09-27

## 2021-07-08 RX ORDER — LEVOTHYROXINE SODIUM 88 UG/1
88 TABLET ORAL DAILY
Qty: 30 TABLET | Refills: 5 | Status: SHIPPED | OUTPATIENT
Start: 2021-07-08 | End: 2021-09-27 | Stop reason: SDUPTHER

## 2021-07-08 NOTE — TELEPHONE ENCOUNTER
Pt called states had TSH drawn on 07/05/21 level was 6.95 also potassium and sodium was high pt wants to know if her needs to lay off on his Levothyroxine 75 mcg. Pt last seen 06/16/21 pt next appt 09/21/21. Please notify pt

## 2021-09-27 ENCOUNTER — TELEPHONE (OUTPATIENT)
Dept: ENDOCRINOLOGY | Facility: CLINIC | Age: 51
End: 2021-09-27

## 2021-09-27 ENCOUNTER — OFFICE VISIT (OUTPATIENT)
Dept: ENDOCRINOLOGY | Facility: CLINIC | Age: 51
End: 2021-09-27

## 2021-09-27 VITALS
WEIGHT: 282 LBS | OXYGEN SATURATION: 95 % | DIASTOLIC BLOOD PRESSURE: 70 MMHG | BODY MASS INDEX: 36.19 KG/M2 | HEIGHT: 74 IN | HEART RATE: 70 BPM | SYSTOLIC BLOOD PRESSURE: 118 MMHG

## 2021-09-27 DIAGNOSIS — E78.2 MIXED HYPERLIPIDEMIA: ICD-10-CM

## 2021-09-27 DIAGNOSIS — E66.01 CLASS 2 SEVERE OBESITY DUE TO EXCESS CALORIES WITH SERIOUS COMORBIDITY AND BODY MASS INDEX (BMI) OF 36.0 TO 36.9 IN ADULT (HCC): ICD-10-CM

## 2021-09-27 DIAGNOSIS — E11.69 TYPE 2 DIABETES MELLITUS WITH OTHER SPECIFIED COMPLICATION, WITHOUT LONG-TERM CURRENT USE OF INSULIN (HCC): Primary | ICD-10-CM

## 2021-09-27 DIAGNOSIS — E89.0 POSTOPERATIVE HYPOTHYROIDISM: ICD-10-CM

## 2021-09-27 LAB
EXPIRATION DATE: NORMAL
EXPIRATION DATE: NORMAL
GLUCOSE BLDC GLUCOMTR-MCNC: 107 MG/DL (ref 70–130)
HBA1C MFR BLD: 6.9 %
Lab: NORMAL
Lab: NORMAL

## 2021-09-27 PROCEDURE — 83036 HEMOGLOBIN GLYCOSYLATED A1C: CPT | Performed by: INTERNAL MEDICINE

## 2021-09-27 PROCEDURE — 82947 ASSAY GLUCOSE BLOOD QUANT: CPT | Performed by: INTERNAL MEDICINE

## 2021-09-27 PROCEDURE — 99213 OFFICE O/P EST LOW 20 MIN: CPT | Performed by: INTERNAL MEDICINE

## 2021-09-27 RX ORDER — ATORVASTATIN CALCIUM 40 MG/1
40 TABLET, FILM COATED ORAL DAILY
Qty: 90 TABLET | Refills: 3 | Status: SHIPPED | OUTPATIENT
Start: 2021-09-27 | End: 2022-08-25 | Stop reason: SDUPTHER

## 2021-09-27 RX ORDER — OMEPRAZOLE 20 MG/1
20 CAPSULE, DELAYED RELEASE ORAL DAILY
COMMUNITY
Start: 2021-07-05

## 2021-09-27 RX ORDER — LEVOTHYROXINE SODIUM 0.1 MG/1
100 TABLET ORAL DAILY
Qty: 30 TABLET | Refills: 5 | Status: SHIPPED | OUTPATIENT
Start: 2021-09-27 | End: 2022-04-06 | Stop reason: SDUPTHER

## 2022-04-06 DIAGNOSIS — E89.0 POSTOPERATIVE HYPOTHYROIDISM: ICD-10-CM

## 2022-04-06 RX ORDER — LEVOTHYROXINE SODIUM 0.1 MG/1
100 TABLET ORAL DAILY
Qty: 30 TABLET | Refills: 1 | Status: SHIPPED | OUTPATIENT
Start: 2022-04-06 | End: 2022-04-18

## 2022-04-18 ENCOUNTER — LAB (OUTPATIENT)
Dept: LAB | Facility: HOSPITAL | Age: 52
End: 2022-04-18

## 2022-04-18 ENCOUNTER — OFFICE VISIT (OUTPATIENT)
Dept: ENDOCRINOLOGY | Facility: CLINIC | Age: 52
End: 2022-04-18

## 2022-04-18 VITALS
WEIGHT: 284 LBS | OXYGEN SATURATION: 97 % | HEIGHT: 73 IN | DIASTOLIC BLOOD PRESSURE: 72 MMHG | SYSTOLIC BLOOD PRESSURE: 112 MMHG | BODY MASS INDEX: 37.64 KG/M2 | HEART RATE: 73 BPM

## 2022-04-18 DIAGNOSIS — E89.0 POSTOPERATIVE HYPOTHYROIDISM: ICD-10-CM

## 2022-04-18 DIAGNOSIS — E78.2 MIXED HYPERLIPIDEMIA: ICD-10-CM

## 2022-04-18 DIAGNOSIS — E11.69 TYPE 2 DIABETES MELLITUS WITH OTHER SPECIFIED COMPLICATION, WITHOUT LONG-TERM CURRENT USE OF INSULIN: ICD-10-CM

## 2022-04-18 DIAGNOSIS — E11.65 TYPE 2 DIABETES MELLITUS WITH HYPERGLYCEMIA, WITHOUT LONG-TERM CURRENT USE OF INSULIN: ICD-10-CM

## 2022-04-18 LAB
EXPIRATION DATE: ABNORMAL
EXPIRATION DATE: NORMAL
GLUCOSE BLDC GLUCOMTR-MCNC: 257 MG/DL (ref 70–130)
HBA1C MFR BLD: 7.3 %
Lab: ABNORMAL
Lab: NORMAL

## 2022-04-18 PROCEDURE — 82570 ASSAY OF URINE CREATININE: CPT | Performed by: INTERNAL MEDICINE

## 2022-04-18 PROCEDURE — 84443 ASSAY THYROID STIM HORMONE: CPT | Performed by: INTERNAL MEDICINE

## 2022-04-18 PROCEDURE — 3051F HG A1C>EQUAL 7.0%<8.0%: CPT | Performed by: INTERNAL MEDICINE

## 2022-04-18 PROCEDURE — 82947 ASSAY GLUCOSE BLOOD QUANT: CPT | Performed by: INTERNAL MEDICINE

## 2022-04-18 PROCEDURE — 99214 OFFICE O/P EST MOD 30 MIN: CPT | Performed by: INTERNAL MEDICINE

## 2022-04-18 PROCEDURE — 83036 HEMOGLOBIN GLYCOSYLATED A1C: CPT | Performed by: INTERNAL MEDICINE

## 2022-04-18 PROCEDURE — 82043 UR ALBUMIN QUANTITATIVE: CPT | Performed by: INTERNAL MEDICINE

## 2022-04-18 RX ORDER — TAMSULOSIN HYDROCHLORIDE 0.4 MG/1
1 CAPSULE ORAL EVERY EVENING
COMMUNITY
Start: 2022-04-03

## 2022-04-18 RX ORDER — LEVOTHYROXINE SODIUM 112 UG/1
112 TABLET ORAL DAILY
COMMUNITY
Start: 2022-04-07 | End: 2022-04-19 | Stop reason: SDUPTHER

## 2022-04-18 RX ORDER — METFORMIN HYDROCHLORIDE 500 MG/1
1000 TABLET, EXTENDED RELEASE ORAL 2 TIMES DAILY
Qty: 360 TABLET | Refills: 1 | Status: SHIPPED | OUTPATIENT
Start: 2022-04-18 | End: 2022-08-25 | Stop reason: SDUPTHER

## 2022-04-18 RX ORDER — DULOXETIN HYDROCHLORIDE 60 MG/1
60 CAPSULE, DELAYED RELEASE ORAL DAILY
COMMUNITY
Start: 2022-04-04

## 2022-04-18 NOTE — PROGRESS NOTES
"Chief Complaint   Patient presents with   • Diabetes          HPI   Cameron Courtney is a 51 y.o. male had concerns including Diabetes.    He is checking blood sugars 0 times per day - ran out of test strips. Was checking only once per week.   Current medications for diabetes include metformin 1000 mg BID and jardiance 25 mg once daily.    BG is 257 today. He had fruity hermes for breakfast.     Dose of ltx was increased since his last visit here.     The following portions of the patient's history were reviewed and updated as appropriate: allergies, current medications, past family history, past medical history, past social history, past surgical history and problem list.      Review of Systems   Constitutional: Positive for fatigue.   Endocrine:        See HPI        Physical Exam  Vitals reviewed.   Constitutional:       Appearance: Normal appearance. He is obese.      Comments: Body mass index is 37.47 kg/m².   Cardiovascular:      Rate and Rhythm: Normal rate.   Pulmonary:      Effort: Pulmonary effort is normal.   Neurological:      General: No focal deficit present.      Mental Status: He is alert. Mental status is at baseline.   Psychiatric:         Mood and Affect: Mood normal.         Behavior: Behavior normal.        /72   Pulse 73   Ht 185.4 cm (73\")   Wt 129 kg (284 lb)   SpO2 97%   BMI 37.47 kg/m²      Labs and imaging    HbA1c:  Lab Results   Component Value Date    HGBA1C 7.3 04/18/2022    HGBA1C 6.9 09/27/2021     Glucose:    Lab Results   Component Value Date    POCGLU 257 (A) 04/18/2022     Microalbumin:  Lab Results   Component Value Date    MALBCRERATIO 6.1 03/11/2021     TSH:  Lab Results   Component Value Date    TSH 7.760 (H) 03/11/2021     Labs 12/1/2020 CMP with creatinine 1.14, GFR 74, AST 57, ALT 92, total cholesterol 172, triglycerides 157, HDL 40, , TSH 10.8, random urine albumin 17.6  Labs from 3/11/2021 CMP with creatinine 1.16, GFR 73, AST 43 with an upper limit of " 40, ALT 62 with an upper limit of 44     7/5/2021 TSH 6.95, free T4 1.19, CMP with glucose 121, creatinine 1.2, GFR 70, potassium 5.6, calcium 10.3 with an albumin of 4.6, ALT 54, AST and alkaline phosphatase normal, , HDL 36, triglycerides 142, total cholesterol 169, urine albumin to creatinine ratio normal     8/30/21 TSH 4.98, free T4 1.10, Cr 1.06, GFR > 60     Assessment and plan  Diagnoses and all orders for this visit:    1. Type 2 diabetes mellitus with hyperglycemia, without long-term current use of insulin (HCC) (Primary)  Uncontrolled with hyperglycemia.  A1c up to 7.3.  Due to dietary indiscretion.  Complicated by neuropathy, also due to spinal issues.  Continue metformin 1000 mg twice daily and Jardiance 25 mg daily.  He has declined GLP-1 agonist in the past.  Dietary modifications are necessary.  He needs to check his glucose levels more consistently.  Call the office if persistently elevated.  Labs are up-to-date from July.  Urine microalbumin normal in July.  Ophtho exam up-to-date from August and asked the patient to have the report sent here.  Monofilament up-to-date from September.  -     POC Glucose, Blood  -     POC Glycosylated Hemoglobin (Hb A1C)  -     Microalbumin / Creatinine Urine Ratio - Urine, Clean Catch  -     empagliflozin (Jardiance) 25 MG tablet tablet; Take 1 tablet by mouth Daily.  Dispense: 90 tablet; Refill: 1  -     metFORMIN ER (GLUCOPHAGE-XR) 500 MG 24 hr tablet; Take 2 tablets by mouth 2 (Two) Times a Day.  Dispense: 360 tablet; Refill: 1  -     glucose blood test strip; check glucose daily  Dispense: 100 each; Refill: 3    2. Postoperative hypothyroidism  Status post GALLARDO for Graves' disease and underwent thyroidectomy 8/4/2020 for atypical thyroid nodule.  Final pathology was benign.  Complaining of fatigue.  Clinically euthyroid on levothyroxine 112 mcg daily.  PCP increased in January.  We will have reviewed those labs.  Recheck TFTs today and titrate  levothyroxine if able for TSH in the lower range of normal.  -TSH    3. Mixed hyperlipidemia  Lipids last checked in July.  Monitor yearly.  Continue atorvastatin 40 mg daily.  Patient has a history of intolerance to rosuvastatin.      Return in about 4 months (around 8/18/2022) for next scheduled follow up. The patient was instructed to contact the clinic with any interval questions or concerns.    Juli Ely, DO   Endocrinologist    Please note that portions of this note were completed with a voice recognition program.

## 2022-04-19 DIAGNOSIS — E11.65 TYPE 2 DIABETES MELLITUS WITH HYPERGLYCEMIA, WITHOUT LONG-TERM CURRENT USE OF INSULIN: Primary | ICD-10-CM

## 2022-04-19 LAB
ALBUMIN UR-MCNC: <1.2 MG/DL
CREAT UR-MCNC: 48.9 MG/DL
MICROALBUMIN/CREAT UR: NORMAL MG/G{CREAT}
TSH SERPL DL<=0.05 MIU/L-ACNC: 4.32 UIU/ML (ref 0.27–4.2)

## 2022-04-19 RX ORDER — LEVOTHYROXINE SODIUM 0.12 MG/1
125 TABLET ORAL DAILY
Qty: 90 TABLET | Refills: 1 | Status: SHIPPED | OUTPATIENT
Start: 2022-04-19 | End: 2022-08-29 | Stop reason: SDUPTHER

## 2022-07-29 NOTE — PROGRESS NOTES
Anesthesia Transfer of Care Note    Patient: Josep Bailey    Procedure(s) Performed: Procedure(s) (LRB):  REPAIR, HERNIA, FEMORAL, NONREDUCIBLE, WITHOUT HISTORY OF PRIOR REPAIR (Right)    Patient location: PACU    Anesthesia Type: general    Transport from OR: Transported from OR on 6-10 L/min O2 by face mask with adequate spontaneous ventilation    Post pain: adequate analgesia    Post assessment: tolerated procedure well and no apparent anesthetic complications    Post vital signs: stable    Level of consciousness: sedated    Nausea/Vomiting: no nausea/vomiting    Complications: none    Transfer of care protocol was followed      Last vitals:   Visit Vitals  BP (!) 92/60   Pulse 77   Temp 36.3 °C (97.3 °F)   Resp 18   Wt 22.8 kg (50 lb 4.2 oz)   SpO2 99%      Chief Complaint   Patient presents with   • Diabetes   • Thyroid Problem          HPI   Cameron Courtney is a 51 y.o. male had concerns including Diabetes and Thyroid Problem.    He is checking blood sugars rarely.  Current medications for diabetes include metformin 1000 mg BID and jardiance 25 mg daily.    In the last few months notes his muscles feel tight, everything feels heavy - even paper.   Was changed from atorvastatin to rosuvastatin after his last visit.     Had labs checked at [facility] 4-5 weeks ago. We didn't receive those. Dose of levothyroxine was increased after last visit for elevated TSH.     Is on levothyroxine 88 mcg daily.     The following portions of the patient's history were reviewed and updated as appropriate: allergies, current medications, past family history, past medical history, past social history, past surgical history and problem list.      Review of Systems   Constitutional: Positive for fatigue.   Musculoskeletal: Positive for myalgias.   Neurological: Positive for weakness.   Psychiatric/Behavioral: Positive for sleep disturbance.        Physical Exam  Vitals reviewed.   Constitutional:       Appearance: Normal appearance. He is obese.      Comments: Body mass index is 36.21 kg/m².   Cardiovascular:      Rate and Rhythm: Normal rate.      Pulses:           Dorsalis pedis pulses are 1+ on the right side and 1+ on the left side.   Pulmonary:      Effort: Pulmonary effort is normal.   Feet:      Right foot:      Skin integrity: Skin integrity normal.      Toenail Condition: Right toenails are normal.      Left foot:      Skin integrity: Skin integrity normal.      Toenail Condition: Left toenails are normal.      Comments: Diabetic Foot Exam Performed and Monofilament Test Performed    Monofilament 1/5 right and 3/5 left, diminished diffusely    Neurological:      General: No focal deficit present.      Mental Status: He is alert. Mental status is at baseline.   Psychiatric:          "Mood and Affect: Mood normal.         Behavior: Behavior normal.        /70   Pulse 70   Ht 188 cm (74\")   Wt 128 kg (282 lb)   SpO2 95%   BMI 36.21 kg/m²      Labs and imaging    HbA1c:  Lab Results   Component Value Date    HGBA1C 6.9 09/27/2021    HGBA1C 7.7 06/16/2021     Glucose:    Lab Results   Component Value Date    POCGLU 107 09/27/2021     Microalbumin:  Lab Results   Component Value Date    MALBCRERATIO 6.1 03/11/2021     TSH:  Lab Results   Component Value Date    TSH 7.760 (H) 03/11/2021     Labs from 3/11/2021 CMP with creatinine 1.16, GFR 73, AST 43 with an upper limit of 40, ALT 62 with an upper limit of 44  Labs 12/1/2020 CMP with creatinine 1.14, GFR 74, AST 57, ALT 92, total cholesterol 172, triglycerides 157, HDL 40, , TSH 10.8, random urine albumin 17.6    7/5/2021 TSH 6.95, free T4 1.19, CMP with glucose 121, creatinine 1.2, GFR 70, potassium 5.6, calcium 10.3 with an albumin of 4.6, ALT 54, AST and alkaline phosphatase normal, , HDL 36, triglycerides 142, total cholesterol 169, urine albumin to creatinine ratio normal    8/30/21 TSH 4.98, free T4 1.10, Cr 1.06, GFR > 60    Assessment and plan  Diagnoses and all orders for this visit:    1. Type 2 diabetes mellitus with other specified complication, without long-term current use of insulin (CMS/[facility]) (Primary)  Controlled with A1c improved to 6.9.  Complicated by neuropathy due in part to back issues.  Continue metformin and Jardiance.  He has not been interested in injectable GLP-1 agonists.  If Rybelsus is covered in the future, consider the addition.  Urine microalbumin normal from July.  All labs up-to-date from July.  Ophtho exam is pending.  Monofilament was updated in the office today.  Recommended he monitor BG several times per week.  -     POC Glycosylated Hemoglobin (Hb A1C)  -     POC Glucose, Blood  -     empagliflozin (Jardiance) 25 MG tablet tablet; Take 1 tablet by mouth Daily.  Dispense: 90 tablet; " Refill: 1    2. Postoperative hypothyroidism  Status post GALLARDO for Graves' disease and underwent thyroidectomy 8/4/2020 for atypical thyroid nodule.  Final pathology was benign.  Complaining of fatigue.  TSH still above goal at 4.98.  Increase levothyroxine to 100 mcg daily and recheck TFTs in 6 weeks.  -     levothyroxine (SYNTHROID, LEVOTHROID) 100 MCG tablet; Take 1 tablet by mouth Daily.  Dispense: 30 tablet; Refill: 5  -     T4, Free; Future  -     TSH; Future    3. Mixed hyperlipidemia  Patient has developed myalgia and weakness since changing to rosuvastatin.  Change back to atorvastatin 40 mg daily.  -     atorvastatin (Lipitor) 40 MG tablet; Take 1 tablet by mouth Daily.  Dispense: 90 tablet; Refill: 3    4. Class 2 severe obesity due to excess calories with serious comorbidity and body mass index (BMI) of 36.0 to 36.9 in adult (HCC)  BMI down to 36.2 with an 8 pound weight loss since his last visit here.  Congratulated on efforts and encouraged to continue.       Return in about 4 months (around 1/27/2022) for next scheduled follow up. The patient was instructed to contact the clinic with any interval questions or concerns.    Juli Ely, DO   Endocrinologist    Please note that portions of this document were completed with a voice recognition program. Efforts were made to edit the dictations, but occasionally words are mis-transcribed.

## 2022-08-19 ENCOUNTER — DOCUMENTATION (OUTPATIENT)
Dept: ENDOCRINOLOGY | Facility: CLINIC | Age: 52
End: 2022-08-19

## 2022-08-19 NOTE — PROGRESS NOTES
Specialty Pharmacy Patient Management Program  Endocrinology Benefits Investigation      Cameron is seen by a [facility] Endocrinology provider and is currently taking a [employer] specialty medication.  The patient IS ELIGIBLE for enrollment in the Endocrinology Patient Management Program offered by [facility].     Insurance: [place] Medicaid  Medication(s) and Costs:   Jardiance $0/90d

## 2022-08-25 ENCOUNTER — SPECIALTY PHARMACY (OUTPATIENT)
Dept: ENDOCRINOLOGY | Facility: CLINIC | Age: 52
End: 2022-08-25

## 2022-08-25 ENCOUNTER — LAB (OUTPATIENT)
Dept: LAB | Facility: HOSPITAL | Age: 52
End: 2022-08-25

## 2022-08-25 ENCOUNTER — OFFICE VISIT (OUTPATIENT)
Dept: ENDOCRINOLOGY | Facility: CLINIC | Age: 52
End: 2022-08-25

## 2022-08-25 VITALS
HEART RATE: 60 BPM | DIASTOLIC BLOOD PRESSURE: 68 MMHG | BODY MASS INDEX: 36.05 KG/M2 | HEIGHT: 73 IN | WEIGHT: 272 LBS | SYSTOLIC BLOOD PRESSURE: 110 MMHG | OXYGEN SATURATION: 96 %

## 2022-08-25 DIAGNOSIS — E78.2 MIXED HYPERLIPIDEMIA: ICD-10-CM

## 2022-08-25 DIAGNOSIS — E11.42 TYPE 2 DIABETES MELLITUS WITH DIABETIC POLYNEUROPATHY, WITHOUT LONG-TERM CURRENT USE OF INSULIN: Primary | ICD-10-CM

## 2022-08-25 DIAGNOSIS — E89.0 POSTOPERATIVE HYPOTHYROIDISM: ICD-10-CM

## 2022-08-25 LAB
ALBUMIN SERPL-MCNC: 4 G/DL (ref 3.5–5.2)
ALBUMIN/GLOB SERPL: 1.5 G/DL
ALP SERPL-CCNC: 80 U/L (ref 39–117)
ALT SERPL W P-5'-P-CCNC: 34 U/L (ref 1–41)
ANION GAP SERPL CALCULATED.3IONS-SCNC: 10.6 MMOL/L (ref 5–15)
AST SERPL-CCNC: 21 U/L (ref 1–40)
BILIRUB SERPL-MCNC: 0.6 MG/DL (ref 0–1.2)
BUN SERPL-MCNC: 19 MG/DL (ref 6–20)
BUN/CREAT SERPL: 17.6 (ref 7–25)
CALCIUM SPEC-SCNC: 9.4 MG/DL (ref 8.6–10.5)
CHLORIDE SERPL-SCNC: 103 MMOL/L (ref 98–107)
CHOLEST SERPL-MCNC: 169 MG/DL (ref 0–200)
CO2 SERPL-SCNC: 25.4 MMOL/L (ref 22–29)
CREAT SERPL-MCNC: 1.08 MG/DL (ref 0.76–1.27)
DEPRECATED RDW RBC AUTO: 38.7 FL (ref 37–54)
EGFRCR SERPLBLD CKD-EPI 2021: 82.6 ML/MIN/1.73
ERYTHROCYTE [DISTWIDTH] IN BLOOD BY AUTOMATED COUNT: 12.6 % (ref 12.3–15.4)
EXPIRATION DATE: NORMAL
EXPIRATION DATE: NORMAL
GLOBULIN UR ELPH-MCNC: 2.6 GM/DL
GLUCOSE BLDC GLUCOMTR-MCNC: 120 MG/DL (ref 70–130)
GLUCOSE SERPL-MCNC: 103 MG/DL (ref 65–99)
HBA1C MFR BLD: 6.8 %
HCT VFR BLD AUTO: 40.1 % (ref 37.5–51)
HDLC SERPL-MCNC: 37 MG/DL (ref 40–60)
HGB BLD-MCNC: 13.6 G/DL (ref 13–17.7)
LDLC SERPL CALC-MCNC: 97 MG/DL (ref 0–100)
LDLC/HDLC SERPL: 2.46 {RATIO}
Lab: NORMAL
Lab: NORMAL
MCH RBC QN AUTO: 29.1 PG (ref 26.6–33)
MCHC RBC AUTO-ENTMCNC: 33.9 G/DL (ref 31.5–35.7)
MCV RBC AUTO: 85.7 FL (ref 79–97)
PLATELET # BLD AUTO: 281 10*3/MM3 (ref 140–450)
PMV BLD AUTO: 9.6 FL (ref 6–12)
POTASSIUM SERPL-SCNC: 4.2 MMOL/L (ref 3.5–5.2)
PROT SERPL-MCNC: 6.6 G/DL (ref 6–8.5)
RBC # BLD AUTO: 4.68 10*6/MM3 (ref 4.14–5.8)
SODIUM SERPL-SCNC: 139 MMOL/L (ref 136–145)
T4 FREE SERPL-MCNC: 1.4 NG/DL (ref 0.93–1.7)
TRIGL SERPL-MCNC: 205 MG/DL (ref 0–150)
TSH SERPL DL<=0.05 MIU/L-ACNC: 2.81 UIU/ML (ref 0.27–4.2)
VLDLC SERPL-MCNC: 35 MG/DL (ref 5–40)
WBC NRBC COR # BLD: 6.27 10*3/MM3 (ref 3.4–10.8)

## 2022-08-25 PROCEDURE — 80061 LIPID PANEL: CPT | Performed by: INTERNAL MEDICINE

## 2022-08-25 PROCEDURE — 82947 ASSAY GLUCOSE BLOOD QUANT: CPT | Performed by: INTERNAL MEDICINE

## 2022-08-25 PROCEDURE — 84439 ASSAY OF FREE THYROXINE: CPT | Performed by: INTERNAL MEDICINE

## 2022-08-25 PROCEDURE — 3044F HG A1C LEVEL LT 7.0%: CPT | Performed by: INTERNAL MEDICINE

## 2022-08-25 PROCEDURE — 83036 HEMOGLOBIN GLYCOSYLATED A1C: CPT | Performed by: INTERNAL MEDICINE

## 2022-08-25 PROCEDURE — 99214 OFFICE O/P EST MOD 30 MIN: CPT | Performed by: INTERNAL MEDICINE

## 2022-08-25 PROCEDURE — 80050 GENERAL HEALTH PANEL: CPT | Performed by: INTERNAL MEDICINE

## 2022-08-25 RX ORDER — BLOOD-GLUCOSE METER
1 KIT MISCELLANEOUS DAILY
Qty: 1 EACH | Refills: 0 | Status: SHIPPED | OUTPATIENT
Start: 2022-08-25

## 2022-08-25 RX ORDER — ATORVASTATIN CALCIUM 40 MG/1
40 TABLET, FILM COATED ORAL DAILY
Qty: 90 TABLET | Refills: 3 | Status: SHIPPED | OUTPATIENT
Start: 2022-08-25 | End: 2022-08-29

## 2022-08-25 RX ORDER — METFORMIN HYDROCHLORIDE 500 MG/1
1000 TABLET, EXTENDED RELEASE ORAL 2 TIMES DAILY
Qty: 360 TABLET | Refills: 3 | Status: SHIPPED | OUTPATIENT
Start: 2022-08-25

## 2022-08-25 NOTE — PROGRESS NOTES
"Chief Complaint   Patient presents with   • Diabetes          HPI   Cameron Courtney is a 51 y.o. male had concerns including Diabetes.    He is checking blood sugars 0 times per day. Meter broke.  Current medications for diabetes include metformin 1000 mg twice daily and Jardiance 25 mg daily.    Weight is down. Has cut back on sweets.     Notes chronic fatigue. No noted change in symptoms on higher dose of levothyroxine. Had labs checked since his dose change last visit but they weren't sent here.  Has KENRICK and uses CPAP.     The following portions of the patient's history were reviewed and updated as appropriate: allergies, current medications, past family history, past medical history, past social history, past surgical history and problem list.      Review of Systems   Constitutional: Positive for fatigue.   Endocrine: Negative.         Physical Exam  Vitals reviewed.   Constitutional:       Appearance: Normal appearance. He is obese.      Comments: Body mass index is 35.89 kg/m².   Cardiovascular:      Rate and Rhythm: Normal rate.      Pulses:           Dorsalis pedis pulses are 1+ on the right side and 1+ on the left side.   Pulmonary:      Effort: Pulmonary effort is normal.   Feet:      Right foot:      Skin integrity: Skin integrity normal.      Toenail Condition: Right toenails are normal.      Left foot:      Skin integrity: Skin integrity normal.      Toenail Condition: Left toenails are abnormally thick.      Comments: Diabetic Foot Exam Performed and Monofilament Test Performed     Monofilament 3-4/5 bilaterally, worse right foot, diminished diffusely          Neurological:      General: No focal deficit present.      Mental Status: He is alert. Mental status is at baseline.   Psychiatric:         Mood and Affect: Mood normal.         Behavior: Behavior normal.        /68   Pulse 60   Ht 185.4 cm (73\")   Wt 123 kg (272 lb)   SpO2 96%   BMI 35.89 kg/m²      Labs and imaging    HbA1c:  Lab " Results   Component Value Date    HGBA1C 6.8 08/25/2022    HGBA1C 7.3 04/18/2022     Glucose:    Lab Results   Component Value Date    POCGLU 120 08/25/2022     Microalbumin:  Lab Results   Component Value Date    MALBCRERATIO  04/18/2022      Comment:      Unable to calculate     TSH:  Lab Results   Component Value Date    TSH 4.320 (H) 04/18/2022     Labs 12/1/2020 CMP with creatinine 1.14, GFR 74, AST 57, ALT 92, total cholesterol 172, triglycerides 157, HDL 40, , TSH 10.8, random urine albumin 17.6  Labs from 3/11/2021 CMP with creatinine 1.16, GFR 73, AST 43 with an upper limit of 40, ALT 62 with an upper limit of 44     7/5/2021 TSH 6.95, free T4 1.19, CMP with glucose 121, creatinine 1.2, GFR 70, potassium 5.6, calcium 10.3 with an albumin of 4.6, ALT 54, AST and alkaline phosphatase normal, , HDL 36, triglycerides 142, total cholesterol 169, urine albumin to creatinine ratio normal     8/30/21 TSH 4.98, free T4 1.10, Cr 1.06, GFR > 60     Assessment and plan  Diagnoses and all orders for this visit:    1. Type 2 diabetes mellitus with diabetic polyneuropathy, without long-term current use of insulin (HCC) (Primary)  Controlled with A1c down to 6.8 after dietary changes and weight loss. Complicated by neuropathy - also due to spinal issues.   Continue metformin and Jardiance.  Patient has declined GLP-1 agonist past.    Check BG's daily and call the office if persistently elevated.  Labs are due and will be checked today.  Ophtho exam should be updated yearly.  Monofilament updated today  .-     POC Glucose, Blood  -     POC Glycosylated Hemoglobin (Hb A1C)  -     glucose monitor monitoring kit; 1 each Daily.  Dispense: 1 each; Refill: 0  -     CBC (No Diff)  -     Comprehensive Metabolic Panel  -     Lipid Panel  -     empagliflozin (Jardiance) 25 MG tablet tablet; Take 1 tablet by mouth Daily.  Dispense: 90 tablet; Refill: 3  -     metFORMIN ER (GLUCOPHAGE-XR) 500 MG 24 hr tablet; Take 2  tablets by mouth 2 (Two) Times a Day.  Dispense: 360 tablet; Refill: 3  -     glucose blood test strip; check glucose daily  Dispense: 100 each; Refill: 3    2. Postoperative hypothyroidism  Does increased after his last visit for elevated TSH though patient still complaining fatigue.  Recheck TFTs today and titrate levothyroxine as needed.  Currently on levothyroxine 125 mcg daily.  -     TSH  -     T4, Free    3. Mixed hyperlipidemia  On atorvastatin 40 mg daily.  Recheck lipids today.  -     atorvastatin (Lipitor) 40 MG tablet; Take 1 tablet by mouth Daily.  Dispense: 90 tablet; Refill: 3           Return in about 4 months (around 12/25/2022) for next scheduled follow up. The patient was instructed to contact the clinic with any interval questions or concerns.    Juli Ely, DO   Endocrinologist    Please note that portions of this note were completed with a voice recognition program.

## 2022-08-25 NOTE — PROGRESS NOTES
Specialty Pharmacy Patient Management Program  Declined Pharmacist Outreach     Cameron Courtney is a 51 y.o. male with Type 2 Diabetes seen by an Endocrinology provider.  Cameron Courtney DECLINED to speak with PharmD about filling specialty medication at [facility] and/or enrollment in the Endocrine Disorders Patient Management Program at this time.  Patient is ELIGIBLE for services in the future if desired.

## 2022-08-29 DIAGNOSIS — E78.2 MIXED HYPERLIPIDEMIA: ICD-10-CM

## 2022-08-29 DIAGNOSIS — E11.65 TYPE 2 DIABETES MELLITUS WITH HYPERGLYCEMIA, WITHOUT LONG-TERM CURRENT USE OF INSULIN: ICD-10-CM

## 2022-08-29 DIAGNOSIS — E89.0 POSTOPERATIVE HYPOTHYROIDISM: Primary | ICD-10-CM

## 2022-08-29 RX ORDER — ROSUVASTATIN CALCIUM 40 MG/1
40 TABLET, COATED ORAL DAILY
Qty: 90 TABLET | Refills: 3 | Status: SHIPPED | OUTPATIENT
Start: 2022-08-29

## 2022-08-29 RX ORDER — LEVOTHYROXINE SODIUM 137 UG/1
137 TABLET ORAL DAILY
Qty: 30 TABLET | Refills: 5 | Status: SHIPPED | OUTPATIENT
Start: 2022-08-29 | End: 2022-12-28 | Stop reason: SDUPTHER

## 2022-12-27 ENCOUNTER — OFFICE VISIT (OUTPATIENT)
Dept: ENDOCRINOLOGY | Facility: CLINIC | Age: 52
End: 2022-12-27

## 2022-12-27 ENCOUNTER — LAB (OUTPATIENT)
Dept: LAB | Facility: HOSPITAL | Age: 52
End: 2022-12-27

## 2022-12-27 VITALS
HEIGHT: 73 IN | SYSTOLIC BLOOD PRESSURE: 118 MMHG | HEART RATE: 71 BPM | OXYGEN SATURATION: 98 % | BODY MASS INDEX: 36.84 KG/M2 | DIASTOLIC BLOOD PRESSURE: 70 MMHG | WEIGHT: 278 LBS

## 2022-12-27 DIAGNOSIS — E78.2 MIXED HYPERLIPIDEMIA: ICD-10-CM

## 2022-12-27 DIAGNOSIS — E11.65 TYPE 2 DIABETES MELLITUS WITH HYPERGLYCEMIA, WITHOUT LONG-TERM CURRENT USE OF INSULIN: Primary | ICD-10-CM

## 2022-12-27 DIAGNOSIS — E89.0 POSTOPERATIVE HYPOTHYROIDISM: ICD-10-CM

## 2022-12-27 LAB
EXPIRATION DATE: ABNORMAL
EXPIRATION DATE: NORMAL
GLUCOSE BLDC GLUCOMTR-MCNC: 184 MG/DL (ref 70–130)
HBA1C MFR BLD: 7.1 %
Lab: ABNORMAL
Lab: NORMAL
T4 FREE SERPL-MCNC: 1.21 NG/DL (ref 0.93–1.7)
TSH SERPL DL<=0.05 MIU/L-ACNC: 2.19 UIU/ML (ref 0.27–4.2)

## 2022-12-27 PROCEDURE — 99214 OFFICE O/P EST MOD 30 MIN: CPT | Performed by: INTERNAL MEDICINE

## 2022-12-27 PROCEDURE — 84439 ASSAY OF FREE THYROXINE: CPT | Performed by: INTERNAL MEDICINE

## 2022-12-27 PROCEDURE — 82947 ASSAY GLUCOSE BLOOD QUANT: CPT | Performed by: INTERNAL MEDICINE

## 2022-12-27 PROCEDURE — 3051F HG A1C>EQUAL 7.0%<8.0%: CPT | Performed by: INTERNAL MEDICINE

## 2022-12-27 PROCEDURE — 83036 HEMOGLOBIN GLYCOSYLATED A1C: CPT | Performed by: INTERNAL MEDICINE

## 2022-12-27 PROCEDURE — 84443 ASSAY THYROID STIM HORMONE: CPT | Performed by: INTERNAL MEDICINE

## 2022-12-27 RX ORDER — MELOXICAM 15 MG/1
15 TABLET ORAL DAILY
COMMUNITY
Start: 2022-10-04

## 2022-12-27 RX ORDER — CYCLOBENZAPRINE HCL 5 MG
5 TABLET ORAL 3 TIMES DAILY
COMMUNITY
Start: 2022-12-13

## 2022-12-27 NOTE — PROGRESS NOTES
"Chief Complaint   Patient presents with   • Diabetes          HPI   Cameron Courtney is a 52 y.o. male had concerns including Diabetes.    He is checking blood sugars 0-1 times per day. BG ranging 100s-occasional 200s.   Current medications for diabetes include metformin 1000 mg twice daily, Jardiance 25 mg daily.    A1c up a bit to 7.1 today. Has been eating a bit more recently.     He is on levothyroxine 137 mcg daily. No noted change in symptoms since last dose increase.     The following portions of the patient's history were reviewed and updated as appropriate: allergies, current medications, past family history, past medical history, past social history, past surgical history and problem list.      Review of Systems   Constitutional: Positive for fatigue.   Endocrine:        See HPI        Physical Exam  Vitals reviewed.   Constitutional:       Appearance: Normal appearance. He is obese.      Comments: Body mass index is 36.68 kg/m².   Cardiovascular:      Rate and Rhythm: Normal rate.   Pulmonary:      Effort: Pulmonary effort is normal.   Neurological:      General: No focal deficit present.      Mental Status: He is alert. Mental status is at baseline.   Psychiatric:         Mood and Affect: Mood normal.         Behavior: Behavior normal.        /70   Pulse 71   Ht 185.4 cm (73\")   Wt 126 kg (278 lb)   SpO2 98%   BMI 36.68 kg/m²      Labs and imaging    CMP:  Lab Results   Component Value Date    BUN 19 08/25/2022    CREATININE 1.08 08/25/2022    BCR 17.6 08/25/2022     08/25/2022    K 4.2 08/25/2022    CO2 25.4 08/25/2022    CALCIUM 9.4 08/25/2022    ALBUMIN 4.00 08/25/2022    BILITOT 0.6 08/25/2022    ALKPHOS 80 08/25/2022    AST 21 08/25/2022    ALT 34 08/25/2022     Lipid Panel:  Lab Results   Component Value Date    CHOL 169 08/25/2022    TRIG 205 (H) 08/25/2022    HDL 37 (L) 08/25/2022    VLDL 35 08/25/2022    LDL 97 08/25/2022     HbA1c:  Lab Results   Component Value Date    HGBA1C " 7.1 12/27/2022    HGBA1C 6.8 08/25/2022     Glucose:    Lab Results   Component Value Date    POCGLU 184 (A) 12/27/2022     Microalbumin:  Lab Results   Component Value Date    MALBCRERATIO  04/18/2022      Comment:      Unable to calculate     TSH:  Lab Results   Component Value Date    TSH 2.810 08/25/2022       Assessment and plan  Diagnoses and all orders for this visit:    1. Type 2 diabetes mellitus with hyperglycemia, without long-term current use of insulin (HCC) (Primary)  Uncontrolled with occasional hyperglycemia.  A1c has trended up, due to diet.  Complicated by neuropathy due in part due to history of spinal issues.  Continue metformin and Jardiance.  Patient has not been interested in GLP-1 agonist in the past.  Prefers to manage with dietary control.  Labs are up-to-date from August.  Urine microalbumin up-to-date from April.  Monofilament up-to-date from August.  Ophtho exam is due and patient was encouraged to schedule.  -     POC Glucose, Blood  -     POC Glycosylated Hemoglobin (Hb A1C)    2. Postoperative hypothyroidism  Status post GALLARDO for Graves' disease and underwent thyroidectomy 8/4/2020 for atypical thyroid nodule.  Final pathology was benign.   On levothyroxine 137 mcg daily.  Dose increased after last visit but no noted change in fatigue.  Recheck TFTs and titrate levothyroxine if able for TSH in the lower range of normal.  -     T4, Free  -     TSH    3. Mixed hyperlipidemia  With hypertriglyceridemia.  Last levels checked in August.  Continue rosuvastatin 40 mg daily.       Return in about 4 months (around 4/27/2023) for next scheduled follow up. The patient was instructed to contact the clinic with any interval questions or concerns.    Juli Ely,    Endocrinologist    Please note that portions of this note were completed with a voice recognition program.

## 2022-12-28 DIAGNOSIS — E89.0 POSTOPERATIVE HYPOTHYROIDISM: ICD-10-CM

## 2022-12-28 RX ORDER — LEVOTHYROXINE SODIUM 0.15 MG/1
150 TABLET ORAL DAILY
Qty: 30 TABLET | Refills: 5 | Status: SHIPPED | OUTPATIENT
Start: 2022-12-28

## 2023-04-27 ENCOUNTER — OFFICE VISIT (OUTPATIENT)
Dept: ENDOCRINOLOGY | Facility: CLINIC | Age: 53
End: 2023-04-27
Payer: COMMERCIAL

## 2023-04-27 VITALS
HEIGHT: 73 IN | OXYGEN SATURATION: 95 % | BODY MASS INDEX: 35.92 KG/M2 | DIASTOLIC BLOOD PRESSURE: 68 MMHG | WEIGHT: 271 LBS | SYSTOLIC BLOOD PRESSURE: 110 MMHG | HEART RATE: 72 BPM

## 2023-04-27 DIAGNOSIS — E11.65 TYPE 2 DIABETES MELLITUS WITH HYPERGLYCEMIA, WITHOUT LONG-TERM CURRENT USE OF INSULIN: Primary | ICD-10-CM

## 2023-04-27 DIAGNOSIS — E78.2 MIXED HYPERLIPIDEMIA: ICD-10-CM

## 2023-04-27 DIAGNOSIS — E89.0 POSTOPERATIVE HYPOTHYROIDISM: ICD-10-CM

## 2023-04-27 LAB
ALBUMIN UR-MCNC: <1.2 MG/DL
CREAT UR-MCNC: 48.4 MG/DL
EXPIRATION DATE: ABNORMAL
EXPIRATION DATE: NORMAL
GLUCOSE BLDC GLUCOMTR-MCNC: 356 MG/DL (ref 70–130)
HBA1C MFR BLD: 7.2 %
Lab: ABNORMAL
Lab: NORMAL
MICROALBUMIN/CREAT UR: NORMAL MG/G{CREAT}
T4 FREE SERPL-MCNC: 1.46 NG/DL (ref 0.93–1.7)
TSH SERPL DL<=0.05 MIU/L-ACNC: 1.94 UIU/ML (ref 0.27–4.2)

## 2023-04-27 PROCEDURE — 82570 ASSAY OF URINE CREATININE: CPT | Performed by: INTERNAL MEDICINE

## 2023-04-27 PROCEDURE — 84443 ASSAY THYROID STIM HORMONE: CPT | Performed by: INTERNAL MEDICINE

## 2023-04-27 PROCEDURE — 82043 UR ALBUMIN QUANTITATIVE: CPT | Performed by: INTERNAL MEDICINE

## 2023-04-27 PROCEDURE — 84439 ASSAY OF FREE THYROXINE: CPT | Performed by: INTERNAL MEDICINE

## 2023-04-27 RX ORDER — SEMAGLUTIDE 1.34 MG/ML
0.25 INJECTION, SOLUTION SUBCUTANEOUS WEEKLY
Qty: 1.5 ML | Refills: 1 | Status: SHIPPED | OUTPATIENT
Start: 2023-04-27

## 2023-04-27 RX ORDER — ROSUVASTATIN CALCIUM 40 MG/1
40 TABLET, COATED ORAL DAILY
Qty: 90 TABLET | Refills: 3 | Status: SHIPPED | OUTPATIENT
Start: 2023-04-27

## 2023-04-27 RX ORDER — METFORMIN HYDROCHLORIDE 500 MG/1
1000 TABLET, EXTENDED RELEASE ORAL 2 TIMES DAILY
Qty: 360 TABLET | Refills: 3 | Status: SHIPPED | OUTPATIENT
Start: 2023-04-27

## 2023-04-27 NOTE — PROGRESS NOTES
"Chief Complaint   Patient presents with   • Diabetes     Type II          HPI   Cameron Courtney is a 52 y.o. male had concerns including Diabetes (Type II).    He is checking blood sugars rarely times per day.  Current medications for diabetes include metformin 1000 mg BID and jardiance 25 mg.    BG is 356 today. He had a coke zero and pancake with syrup this morning. Recheck 336.     No personal history of pancreatitis. No family history of thyroid cancer. He had thyroid surgically removed.  He has not been on a GLP-1 receptor agonist in the past though is willing to try.    Feeling better on higher dose of levothyroxine. Is on 150 mcg daily.     The following portions of the patient's history were reviewed and updated as appropriate: allergies, current medications, past family history, past medical history, past social history, past surgical history and problem list.      Review of Systems   Constitutional: Negative.    Endocrine: Negative.         Physical Exam  Vitals reviewed.   Constitutional:       Appearance: Normal appearance. He is obese.      Comments: Body mass index is 35.75 kg/m².   Cardiovascular:      Rate and Rhythm: Normal rate.   Pulmonary:      Effort: Pulmonary effort is normal.   Neurological:      General: No focal deficit present.      Mental Status: He is alert. Mental status is at baseline.   Psychiatric:         Mood and Affect: Mood normal.         Behavior: Behavior normal.        /68 (BP Location: Left arm, Patient Position: Sitting, Cuff Size: Adult)   Pulse 72   Ht 185.4 cm (73\")   Wt 123 kg (271 lb)   SpO2 95%   BMI 35.75 kg/m²      Labs and imaging    CMP:  Lab Results   Component Value Date    BUN 19 08/25/2022    CREATININE 1.08 08/25/2022    BCR 17.6 08/25/2022     08/25/2022    K 4.2 08/25/2022    CO2 25.4 08/25/2022    CALCIUM 9.4 08/25/2022    ALBUMIN 4.00 08/25/2022    BILITOT 0.6 08/25/2022    ALKPHOS 80 08/25/2022    AST 21 08/25/2022    ALT 34 08/25/2022 "     Lipid Panel:  Lab Results   Component Value Date    CHOL 169 08/25/2022    TRIG 205 (H) 08/25/2022    HDL 37 (L) 08/25/2022    VLDL 35 08/25/2022    LDL 97 08/25/2022     HbA1c:  Lab Results   Component Value Date    HGBA1C 7.2 04/27/2023    HGBA1C 7.1 12/27/2022     Glucose:    Lab Results   Component Value Date    POCGLU 356 (A) 04/27/2023     Microalbumin:  Lab Results   Component Value Date    MALBCRERATIO  04/18/2022      Comment:      Unable to calculate     TSH:  Lab Results   Component Value Date    TSH 2.190 12/27/2022 12/1/2020 CMP with creatinine 1.14, GFR 74, AST 57, ALT 92, total cholesterol 172, triglycerides 157, HDL 40, , TSH 10.8, random urine albumin 17.6    3/11/2021 CMP with creatinine 1.16, GFR 73, AST 43 with an upper limit of 40, ALT 62 with an upper limit of 44     7/5/2021 TSH 6.95, free T4 1.19, CMP with glucose 121, creatinine 1.2, GFR 70, potassium 5.6, calcium 10.3 with an albumin of 4.6, ALT 54, AST and alkaline phosphatase normal, , HDL 36, triglycerides 142, total cholesterol 169, urine albumin to creatinine ratio normal     8/30/21 TSH 4.98, free T4 1.10, Cr 1.06, GFR > 60      Assessment and plan  Diagnoses and all orders for this visit:    1. Type 2 diabetes mellitus with hyperglycemia, without long-term current use of insulin (Primary)  Uncontrolled with significant hyperglycemia in the office today after eating pancake for breakfast.  A1c up to 7.2.  Complicated by neuropathy, also in part due to spinal issues.  Continue metformin and Jardiance.  Add Ozempic 0.25 mg weekly and titrate up on dose monthly as tolerated.  Caution regarding possible GI side effects.  No personal history of pancreatitis no family history of MEN or MTC.  He has a history of thyroidectomy.  Labs are up-to-date from August.  Monofilament up-to-date from August.  Ophtho exam is pending and patient will have the report sent here.  Check urine microalbumin today.  -     POC Glucose,  Blood  -     POC Glycosylated Hemoglobin (Hb A1C)  -     Semaglutide,0.25 or 0.5MG/DOS, (Ozempic, 0.25 or 0.5 MG/DOSE,) 2 MG/1.5ML solution pen-injector; Inject 0.25 mg under the skin into the appropriate area as directed 1 (One) Time Per Week. Increase to 0.5 mg weekly after four weeks  Dispense: 1.5 mL; Refill: 1  -     Microalbumin / Creatinine Urine Ratio - Urine, Clean Catch  -     empagliflozin (Jardiance) 25 MG tablet tablet; Take 1 tablet by mouth Daily.  Dispense: 90 tablet; Refill: 3  -     metFORMIN ER (GLUCOPHAGE-XR) 500 MG 24 hr tablet; Take 2 tablets by mouth 2 (Two) Times a Day.  Dispense: 360 tablet; Refill: 3  -     glucose blood test strip; check glucose daily  Dispense: 100 each; Refill: 3    2. Postoperative hypothyroidism  Status post GALLARDO for Graves' disease and underwent thyroidectomy 8/4/2020 for atypical thyroid nodule.  Final pathology was benign.    Dose of levothyroxine increased for fatigue after his last visit.  Currently on 150 mcg daily.  Symptoms are improved.  Recheck TFTs today.  -     T4, Free  -     TSH    3. Mixed hyperlipidemia  On rosuvastatin 40 mg daily.  Was changed from atorvastatin.  Labs up-to-date from August.  Monitor yearly.  -     rosuvastatin (CRESTOR) 40 MG tablet; Take 1 tablet by mouth Daily.  Dispense: 90 tablet; Refill: 3         Return in about 4 months (around 8/27/2023) for next scheduled follow up. The patient was instructed to contact the clinic with any interval questions or concerns.    Juli Ely, DO   Endocrinologist    Please note that portions of this note were completed with a voice recognition program.

## 2023-08-14 ENCOUNTER — OFFICE VISIT (OUTPATIENT)
Dept: ENDOCRINOLOGY | Facility: CLINIC | Age: 53
End: 2023-08-14
Payer: COMMERCIAL

## 2023-08-14 VITALS
HEART RATE: 62 BPM | OXYGEN SATURATION: 96 % | HEIGHT: 73 IN | WEIGHT: 272 LBS | SYSTOLIC BLOOD PRESSURE: 120 MMHG | BODY MASS INDEX: 36.05 KG/M2 | DIASTOLIC BLOOD PRESSURE: 74 MMHG

## 2023-08-14 DIAGNOSIS — E89.0 POSTOPERATIVE HYPOTHYROIDISM: ICD-10-CM

## 2023-08-14 DIAGNOSIS — E66.01 CLASS 2 SEVERE OBESITY DUE TO EXCESS CALORIES WITH SERIOUS COMORBIDITY AND BODY MASS INDEX (BMI) OF 35.0 TO 35.9 IN ADULT: ICD-10-CM

## 2023-08-14 DIAGNOSIS — E78.2 MIXED HYPERLIPIDEMIA: ICD-10-CM

## 2023-08-14 DIAGNOSIS — E11.65 TYPE 2 DIABETES MELLITUS WITH HYPERGLYCEMIA, WITHOUT LONG-TERM CURRENT USE OF INSULIN: Primary | ICD-10-CM

## 2023-08-14 DIAGNOSIS — R53.1 WEAKNESS: ICD-10-CM

## 2023-08-14 LAB
25(OH)D3 SERPL-MCNC: 32 NG/ML (ref 30–100)
ALBUMIN SERPL-MCNC: 4.4 G/DL (ref 3.5–5.2)
ALBUMIN/GLOB SERPL: 1.8 G/DL
ALP SERPL-CCNC: 88 U/L (ref 39–117)
ALT SERPL W P-5'-P-CCNC: 29 U/L (ref 1–41)
ANION GAP SERPL CALCULATED.3IONS-SCNC: 7 MMOL/L (ref 5–15)
AST SERPL-CCNC: 21 U/L (ref 1–40)
BILIRUB SERPL-MCNC: 0.4 MG/DL (ref 0–1.2)
BUN SERPL-MCNC: 21 MG/DL (ref 6–20)
BUN/CREAT SERPL: 18.9 (ref 7–25)
CALCIUM SPEC-SCNC: 9.5 MG/DL (ref 8.6–10.5)
CHLORIDE SERPL-SCNC: 104 MMOL/L (ref 98–107)
CHOLEST SERPL-MCNC: 152 MG/DL (ref 0–200)
CK SERPL-CCNC: 137 U/L (ref 20–200)
CO2 SERPL-SCNC: 27 MMOL/L (ref 22–29)
CREAT SERPL-MCNC: 1.11 MG/DL (ref 0.76–1.27)
DEPRECATED RDW RBC AUTO: 40.2 FL (ref 37–54)
EGFRCR SERPLBLD CKD-EPI 2021: 79.4 ML/MIN/1.73
ERYTHROCYTE [DISTWIDTH] IN BLOOD BY AUTOMATED COUNT: 12.5 % (ref 12.3–15.4)
EXPIRATION DATE: ABNORMAL
EXPIRATION DATE: NORMAL
GLOBULIN UR ELPH-MCNC: 2.5 GM/DL
GLUCOSE BLDC GLUCOMTR-MCNC: 160 MG/DL (ref 70–130)
GLUCOSE SERPL-MCNC: 142 MG/DL (ref 65–99)
HBA1C MFR BLD: 7.2 %
HCT VFR BLD AUTO: 41 % (ref 37.5–51)
HDLC SERPL-MCNC: 41 MG/DL (ref 40–60)
HGB BLD-MCNC: 13.7 G/DL (ref 13–17.7)
LDLC SERPL CALC-MCNC: 80 MG/DL (ref 0–100)
LDLC/HDLC SERPL: 1.83 {RATIO}
Lab: ABNORMAL
Lab: NORMAL
MCH RBC QN AUTO: 29.4 PG (ref 26.6–33)
MCHC RBC AUTO-ENTMCNC: 33.4 G/DL (ref 31.5–35.7)
MCV RBC AUTO: 88 FL (ref 79–97)
PLATELET # BLD AUTO: 297 10*3/MM3 (ref 140–450)
PMV BLD AUTO: 9.5 FL (ref 6–12)
POTASSIUM SERPL-SCNC: 4.3 MMOL/L (ref 3.5–5.2)
PROT SERPL-MCNC: 6.9 G/DL (ref 6–8.5)
RBC # BLD AUTO: 4.66 10*6/MM3 (ref 4.14–5.8)
SODIUM SERPL-SCNC: 138 MMOL/L (ref 136–145)
TRIGL SERPL-MCNC: 179 MG/DL (ref 0–150)
TSH SERPL DL<=0.05 MIU/L-ACNC: 3.02 UIU/ML (ref 0.27–4.2)
VIT B12 BLD-MCNC: 306 PG/ML (ref 211–946)
VLDLC SERPL-MCNC: 31 MG/DL (ref 5–40)
WBC NRBC COR # BLD: 7.06 10*3/MM3 (ref 3.4–10.8)

## 2023-08-14 PROCEDURE — 83036 HEMOGLOBIN GLYCOSYLATED A1C: CPT | Performed by: INTERNAL MEDICINE

## 2023-08-14 PROCEDURE — 3078F DIAST BP <80 MM HG: CPT | Performed by: INTERNAL MEDICINE

## 2023-08-14 PROCEDURE — 99214 OFFICE O/P EST MOD 30 MIN: CPT | Performed by: INTERNAL MEDICINE

## 2023-08-14 PROCEDURE — 82306 VITAMIN D 25 HYDROXY: CPT | Performed by: INTERNAL MEDICINE

## 2023-08-14 PROCEDURE — 3074F SYST BP LT 130 MM HG: CPT | Performed by: INTERNAL MEDICINE

## 2023-08-14 PROCEDURE — 1159F MED LIST DOCD IN RCRD: CPT | Performed by: INTERNAL MEDICINE

## 2023-08-14 PROCEDURE — 82607 VITAMIN B-12: CPT | Performed by: INTERNAL MEDICINE

## 2023-08-14 PROCEDURE — 1160F RVW MEDS BY RX/DR IN RCRD: CPT | Performed by: INTERNAL MEDICINE

## 2023-08-14 PROCEDURE — 80061 LIPID PANEL: CPT | Performed by: INTERNAL MEDICINE

## 2023-08-14 PROCEDURE — 82550 ASSAY OF CK (CPK): CPT | Performed by: INTERNAL MEDICINE

## 2023-08-14 PROCEDURE — 3051F HG A1C>EQUAL 7.0%<8.0%: CPT | Performed by: INTERNAL MEDICINE

## 2023-08-14 PROCEDURE — 80050 GENERAL HEALTH PANEL: CPT | Performed by: INTERNAL MEDICINE

## 2023-08-14 PROCEDURE — 82947 ASSAY GLUCOSE BLOOD QUANT: CPT | Performed by: INTERNAL MEDICINE

## 2023-08-14 NOTE — PROGRESS NOTES
Chief Complaint   Patient presents with    Diabetes     Type 2 diabetes mellitus with diabetic polyneuropathy, without long-term current use of insuli          HPI   Cameron Courtney is a 52 y.o. male had concerns including Diabetes (Type 2 diabetes mellitus with diabetic polyneuropathy, without long-term current use of insuli).    He is checking blood sugars infrequently.  Current medications for diabetes include metformin 1000 mg BID and jardiance. Ozempic prescribed after his last visit, but he didn't tolerate at all. Had severe side effects, nausea-vomiting.     Has poor strength some days. Weakness is progressing. He has a reduction in stamina. Sometimes trouble walking up stairs.   Weight is stable. Denies nausea (since stopping ozempic).  Denies chest pain or shortness of breath.   No issues with libido or ED. This is improved recently.    Has been diagnosed with macular degeneration.     The following portions of the patient's history were reviewed and updated as appropriate: allergies, current medications, past family history, past medical history, past social history, past surgical history, and problem list.      Review of Systems   Constitutional:  Positive for activity change.   Endocrine: Negative.    Neurological:  Positive for weakness.      Physical Exam  Vitals reviewed.   Constitutional:       Appearance: Normal appearance. He is obese.      Comments: Body mass index is 35.89 kg/mý.   Cardiovascular:      Rate and Rhythm: Normal rate.      Pulses:           Dorsalis pedis pulses are 2+ on the right side and 2+ on the left side.   Pulmonary:      Effort: Pulmonary effort is normal.   Feet:      Right foot:      Toenail Condition: Right toenails are abnormally thick.      Left foot:      Toenail Condition: Left toenails are abnormally thick.      Comments: Diabetic Foot Exam Performed and Monofilament Test Performed      Monofilament 2-3/5 bilaterally  Neurological:      General: No focal deficit  "present.      Mental Status: He is alert. Mental status is at baseline.   Psychiatric:         Mood and Affect: Mood normal.         Behavior: Behavior normal.      /74 (BP Location: Right arm, Patient Position: Sitting, Cuff Size: Adult)   Pulse 62   Ht 185.4 cm (73\")   Wt 123 kg (272 lb)   SpO2 96%   BMI 35.89 kg/mý      Labs and imaging    CMP:  Lab Results   Component Value Date    BUN 19 08/25/2022    CREATININE 1.08 08/25/2022    BCR 17.6 08/25/2022     08/25/2022    K 4.2 08/25/2022    CO2 25.4 08/25/2022    CALCIUM 9.4 08/25/2022    ALBUMIN 4.00 08/25/2022    BILITOT 0.6 08/25/2022    ALKPHOS 80 08/25/2022    AST 21 08/25/2022    ALT 34 08/25/2022     Lipid Panel:  Lab Results   Component Value Date    CHOL 169 08/25/2022    TRIG 205 (H) 08/25/2022    HDL 37 (L) 08/25/2022    VLDL 35 08/25/2022    LDL 97 08/25/2022     HbA1c:  Lab Results   Component Value Date    HGBA1C 7.2 08/14/2023    HGBA1C 7.2 04/27/2023     Glucose:    Lab Results   Component Value Date    POCGLU 160 (A) 08/14/2023     Microalbumin:  Lab Results   Component Value Date    MALBCRERATIO  04/27/2023      Comment:      Unable to calculate     TSH:  Lab Results   Component Value Date    TSH 1.940 04/27/2023       Assessment and plan  Diagnoses and all orders for this visit:    1. Type 2 diabetes mellitus with hyperglycemia, without long-term current use of insulin (Primary)  Mostly controlled with occasional hyperglycemia.  A1c stable at 7.2.  Complicated by neuropathy, also due to spinal issues.    Continue metformin 1000 mg twice daily and Jardiance 25 mg daily.  He did not tolerate even the lowest dose of Ozempic.  Check BG's more.  Call the office if consistently greater than 180.  Labs are up-to-date from April.  Monofilament updated today.  Ophtho exam is up-to-date and requested the patient have the report sent here.  -     POC Glycosylated Hemoglobin (Hb A1C)  -     POC Glucose, Blood  -     Comprehensive Metabolic " Panel  -     CBC (No Diff)    2. Postoperative hypothyroidism  Euthyroid on levothyroxine 150 mcg daily.  Recheck TSH today due to progressive weakness.  -     TSH    3. Mixed hyperlipidemia  On rosuvastatin 40 mg daily.  Checking fasting lipids and CK level today.    4. Weakness  Labs today.  Denies poor libido or erectile dysfunction.  -     Vitamin B12  -     Vitamin D,25-Hydroxy    5. Class 2 severe obesity due to excess calories with serious comorbidity and body mass index (BMI) of 35.0 to 35.9 in adult  BMI 35.9.  Check vitamin D level.  -     Vitamin D,25-Hydroxy         Return in about 4 months (around 12/14/2023). The patient was instructed to contact the clinic with any interval questions or concerns.    Juli Ely, DO   Endocrinologist    Please note that portions of this note were completed with a voice recognition program.

## 2023-09-21 DIAGNOSIS — E89.0 POSTOPERATIVE HYPOTHYROIDISM: ICD-10-CM

## 2023-09-22 RX ORDER — LEVOTHYROXINE SODIUM 0.15 MG/1
TABLET ORAL
Qty: 90 TABLET | Refills: 3 | Status: SHIPPED | OUTPATIENT
Start: 2023-09-22

## 2023-09-22 NOTE — TELEPHONE ENCOUNTER
Rx Refill Note  Requested Prescriptions     Pending Prescriptions Disp Refills    levothyroxine (SYNTHROID, LEVOTHROID) 150 MCG tablet [Pharmacy Med Name: Levothyroxine Sodium 150 MCG Oral Tablet] 90 tablet 3     Sig: Take 1 tablet by mouth once daily      Last office visit with prescribing clinician: 8/14/2023   Last telemedicine visit with prescribing clinician: Visit date not found   Next office visit with prescribing clinician: 12/28/2023                         Would you like a call back once the refill request has been completed: [] Yes [] No    If the office needs to give you a call back, can they leave a voicemail: [] Yes [] No    Heidy Pham MA  09/22/23, 09:28 EDT

## 2023-12-28 ENCOUNTER — PRIOR AUTHORIZATION (OUTPATIENT)
Dept: ENDOCRINOLOGY | Facility: CLINIC | Age: 53
End: 2023-12-28

## 2023-12-28 ENCOUNTER — OFFICE VISIT (OUTPATIENT)
Dept: ENDOCRINOLOGY | Facility: CLINIC | Age: 53
End: 2023-12-28
Payer: COMMERCIAL

## 2023-12-28 VITALS
SYSTOLIC BLOOD PRESSURE: 110 MMHG | HEART RATE: 68 BPM | HEIGHT: 73 IN | BODY MASS INDEX: 36.84 KG/M2 | OXYGEN SATURATION: 97 % | DIASTOLIC BLOOD PRESSURE: 70 MMHG | WEIGHT: 278 LBS

## 2023-12-28 DIAGNOSIS — E11.65 TYPE 2 DIABETES MELLITUS WITH HYPERGLYCEMIA, WITHOUT LONG-TERM CURRENT USE OF INSULIN: Primary | ICD-10-CM

## 2023-12-28 DIAGNOSIS — E66.01 CLASS 2 SEVERE OBESITY DUE TO EXCESS CALORIES WITH SERIOUS COMORBIDITY AND BODY MASS INDEX (BMI) OF 36.0 TO 36.9 IN ADULT: ICD-10-CM

## 2023-12-28 DIAGNOSIS — E89.0 POSTOPERATIVE HYPOTHYROIDISM: ICD-10-CM

## 2023-12-28 DIAGNOSIS — E78.2 MIXED HYPERLIPIDEMIA: ICD-10-CM

## 2023-12-28 LAB
EXPIRATION DATE: ABNORMAL
EXPIRATION DATE: ABNORMAL
GLUCOSE BLDC GLUCOMTR-MCNC: 215 MG/DL (ref 70–130)
HBA1C MFR BLD: 8 % (ref 4.5–5.7)
Lab: ABNORMAL
Lab: ABNORMAL

## 2023-12-28 RX ORDER — PREGABALIN 50 MG/1
50 CAPSULE ORAL 3 TIMES DAILY
COMMUNITY
Start: 2023-12-11

## 2023-12-28 RX ORDER — CYCLOBENZAPRINE HCL 5 MG
5 TABLET ORAL 3 TIMES DAILY
COMMUNITY
Start: 2023-12-06

## 2023-12-28 NOTE — TELEPHONE ENCOUNTER
Cameron Courtney (Key: [id number])  PA Case ID #: [id number]  Rx #: [id number]  Need Help? Call us at [phone]  Outcome  Approved today  The request has been approved. The authorization is effective from 12/28/2023 to 12/27/2024, as long as the member is enrolled in their current health plan. The request was approved as submitted. A written notification letter will follow with additional details.  Authorization Expiration Date: 12/26/2024  Drug  Mounjaro 2.5MG/0.5ML pen-injectors  ePA cloud logo  Form  [employer] [place] [employer] ePA Form 2017 NCPDP

## 2023-12-28 NOTE — PATIENT INSTRUCTIONS
Please have the eye exam updated yearly and the report faxed to [phone].    Start mounjaro (if covered) 2.5 mg weekly. If tolerated, call or message for a dose increase monthly.

## 2023-12-28 NOTE — PROGRESS NOTES
"Chief Complaint   Patient presents with    Diabetes     Type II    Hypertension    Hypothyroidism          HPI   Cameron Courtney is a 53 y.o. male had concerns including Diabetes (Type II), Hypertension, and Hypothyroidism.    He is checking blood sugars infrequently.  Current medications for diabetes include metformin 1000 mg BID and jardiance 25 mg daily.  Ophtho exam is UTD. Has cataract and MD. Cataract is mild and will be monitored.     Breakfast: eggs, kang, biscuits, today had cereal.   Lunch: may skip, sandwich (white bread)   Dinner: pork, chicken, mashed potatoes and mac and cheese  Drinks: coke zero    Weight is up 6 lbs.     He fell a few weeks ago. Fell off a ladder. Ankle got caught in a ladder rung. Imaging was negative for fracture.     The following portions of the patient's history were reviewed and updated as appropriate: allergies, current medications, past family history, past medical history, past social history, past surgical history, and problem list.      Review of Systems   Constitutional:  Positive for unexpected weight gain.   Musculoskeletal:  Positive for arthralgias.        Physical Exam  Vitals reviewed.   Constitutional:       Appearance: Normal appearance. He is obese.      Comments: Body mass index is 36.68 kg/m².   Cardiovascular:      Rate and Rhythm: Normal rate.   Pulmonary:      Effort: Pulmonary effort is normal.   Neurological:      General: No focal deficit present.      Mental Status: He is alert. Mental status is at baseline.   Psychiatric:         Mood and Affect: Mood normal.         Behavior: Behavior normal.        /70 (BP Location: Left arm, Patient Position: Sitting, Cuff Size: Adult)   Pulse 68   Ht 185.4 cm (73\")   Wt 126 kg (278 lb)   SpO2 97%   BMI 36.68 kg/m²      Labs and imaging    CMP:  Lab Results   Component Value Date    BUN 21 (H) 08/14/2023    CREATININE 1.11 08/14/2023    EGFR 79.4 08/14/2023    BCR 18.9 08/14/2023     08/14/2023    K " 4.3 08/14/2023    CO2 27.0 08/14/2023    CALCIUM 9.5 08/14/2023    ALBUMIN 4.4 08/14/2023    BILITOT 0.4 08/14/2023    ALKPHOS 88 08/14/2023    AST 21 08/14/2023    ALT 29 08/14/2023     Lipid Panel:  Lab Results   Component Value Date    CHOL 152 08/14/2023    TRIG 179 (H) 08/14/2023    HDL 41 08/14/2023    VLDL 31 08/14/2023    LDL 80 08/14/2023     HbA1c:  Lab Results   Component Value Date    HGBA1C 8.0 (A) 12/28/2023    HGBA1C 7.2 08/14/2023     Glucose:    Lab Results   Component Value Date    POCGLU 215 (A) 12/28/2023     Microalbumin:  Lab Results   Component Value Date    MALBCRERATIO  04/27/2023      Comment:      Unable to calculate     TSH:  Lab Results   Component Value Date    TSH 3.020 08/14/2023       Assessment and plan  Diagnoses and all orders for this visit:    1. Type 2 diabetes mellitus with hyperglycemia, without long-term current use of insulin (Primary)  Uncontrolled with hyperglycemia.  Complicated by neuropathy.  A1c up to 8.0.  Continue metformin 1000 mg twice daily and Jardiance 25 mg daily.  History of intolerance to Ozempic.  He is willing to try Mounjaro.  Caution for possible GI side effects.  Increase dose monthly as tolerated.  Labs are up-to-date from August.  Urine microalbumin normal from April.  Monofilament up-to-date from August.  Ophtho exam is up-to-date and requested the patient have the report sent here.  -     POC Glucose, Blood  -     POC Glycosylated Hemoglobin (Hb A1C)  -     Tirzepatide (Mounjaro) 2.5 MG/0.5ML solution pen-injector pen; Inject 0.5 mL under the skin into the appropriate area as directed Every 7 (Seven) Days.  Dispense: 2 mL; Refill: 0    2. Class 2 severe obesity due to excess calories with serious comorbidity and body mass index (BMI) of 36.0 to 36.9 in adult  BMI up to 36.7 with 6 pound weight gain since his last visit here.  Due to high calorie intake.  Add Mounjaro as above.  Weight loss encouraged.    3. Postoperative hypothyroidism  Clinically  and biochemically euthyroid on levothyroxine 150 mcg daily.  Monitor TSH yearly.    4. Mixed hyperlipidemia  On rosuvastatin 40 mg daily.  Lipids last checked in August.  Monitor yearly.       Return in about 4 months (around 4/28/2024) for next scheduled follow up. The patient was instructed to contact the clinic with any interval questions or concerns.    Juli Ely, DO   Endocrinologist    Please note that portions of this note were completed with a voice recognition program.

## 2024-01-23 ENCOUNTER — TELEPHONE (OUTPATIENT)
Dept: ENDOCRINOLOGY | Facility: CLINIC | Age: 54
End: 2024-01-23
Payer: COMMERCIAL

## 2024-01-23 DIAGNOSIS — E11.65 TYPE 2 DIABETES MELLITUS WITH HYPERGLYCEMIA, WITHOUT LONG-TERM CURRENT USE OF INSULIN: Primary | ICD-10-CM

## 2024-01-23 NOTE — TELEPHONE ENCOUNTER
Pt called requesting a dose increase on Mounjaro 2.5 mg current dose. Pt has tolerated well. Please send a dose increase to [facility] in [place]

## 2024-02-26 DIAGNOSIS — E11.65 TYPE 2 DIABETES MELLITUS WITH HYPERGLYCEMIA, WITHOUT LONG-TERM CURRENT USE OF INSULIN: ICD-10-CM

## 2024-02-26 NOTE — TELEPHONE ENCOUNTER
Caller: Cameron Courtney    Relationship: Self    Best call back number: [phone]     Requested Prescriptions:   Requested Prescriptions     Pending Prescriptions Disp Refills    Tirzepatide (Mounjaro) 5 MG/0.5ML solution pen-injector pen 2 mL 0     Sig: Inject 0.5 mL under the skin into the appropriate area as directed Every 7 (Seven) Days.        Pharmacy where request should be sent:  [facility] [address] [place] [address] [phone] [facility] [phone] FX     Last office visit with prescribing clinician: 12/28/2023   Last telemedicine visit with prescribing clinician: Visit date not found   Next office visit with prescribing clinician: 5/28/2024     Additional details provided by patient: PT IS OUT, NEEDS ASAP     Does the patient have less than a 3 day supply:  [x] Yes  [] No    Would you like a call back once the refill request has been completed: [] Yes [x] No    If the office needs to give you a call back, can they leave a voicemail: [] Yes [x] No    Jessica Thrasher Rep   02/26/24 08:41 EST

## 2024-02-27 NOTE — TELEPHONE ENCOUNTER
Rx Refill Note  Requested Prescriptions     Pending Prescriptions Disp Refills    Tirzepatide (Mounjaro) 5 MG/0.5ML solution pen-injector pen 2 mL 0     Sig: Inject 0.5 mL under the skin into the appropriate area as directed Every 7 (Seven) Days.      Last office visit with prescribing clinician: 12/28/2023     Next office visit with prescribing clinician: 5/28/2024       Neha Musa MA  02/27/24, 08:20 EST

## 2024-04-24 DIAGNOSIS — E11.65 TYPE 2 DIABETES MELLITUS WITH HYPERGLYCEMIA, WITHOUT LONG-TERM CURRENT USE OF INSULIN: ICD-10-CM

## 2024-04-24 RX ORDER — METFORMIN HYDROCHLORIDE 500 MG/1
1000 TABLET, EXTENDED RELEASE ORAL 2 TIMES DAILY
Qty: 360 TABLET | Refills: 0 | Status: SHIPPED | OUTPATIENT
Start: 2024-04-24

## 2024-04-24 NOTE — TELEPHONE ENCOUNTER
Rx Refill Note  Requested Prescriptions     Pending Prescriptions Disp Refills    Tirzepatide (Mounjaro) 5 MG/0.5ML solution pen-injector pen 2 mL 1     Sig: Inject 0.5 mL under the skin into the appropriate area as directed Every 7 (Seven) Days.    metFORMIN ER (GLUCOPHAGE-XR) 500 MG 24 hr tablet 360 tablet 3     Sig: Take 2 tablets by mouth 2 (Two) Times a Day.        Last office visit with prescribing clinician: 12/28/2023      Next office visit with prescribing clinician: 5/28/2024       Kaycee Khan (Jodi)  04/24/24, 13:56 EDT

## 2024-04-24 NOTE — TELEPHONE ENCOUNTER
I called the patient no answer, left message to return our call so that we can see what the current  dose is working and if we need a increase.

## 2024-04-24 NOTE — TELEPHONE ENCOUNTER
CHIEF COMPLAINT:    Chief Complaint   Patient presents with   • Follow-up       HISTORY:    Debbie Patterson is a 65 year old female who goes by \"Debbie\" and presents today for diabetes follow up.    When she last came in June her A1C was 10.2 so she prescribed insulin. Since then she has been injecting 18 to 20 units of Lantus once nightly. She also takes metformin 500 mg once in the morning and once at night. She measures her blood glucose once in the morning and once at night and noticed a downward trend in her sugars. Based on her most recent data she never goes below mid 70s at night and mornings are anywhere from .     She has changed her lifestyle greatly since June. She does not drink soda or eat many sweets and is cutting back her bread. For exercise she walks at least 30 minutes daily and also does Frank or Tae Abdulaziz.    She does say that she has had numbness in the right heel in a certain spot. She also does feel like her eyesight is getting worse and changed her prescription lenses last week.     Current Outpatient Medications   Medication Sig Dispense Refill   • Lactobacillus (PROBIOTIC ACIDOPHILUS) Cap Take by mouth daily.     • insulin glargine (LANTUS SOLOSTAR) 100 UNIT/ML pen-injector Start at 20 units at hs , titrate up by on e unit daily to have fbs , 125 , max 20 units at hs 15 mL 12   • CALCIUM CARBONATE PO Take 1 tablet by mouth daily. Indications: Disorder in the Abdominal Part of the Body     • cholecalciferol (VITAMIN D3) 1000 UNITS tablet Take 1,000 Units by mouth daily.     • NON FORMULARY daily. [place]     • atorvastatin (LIPITOR) 10 MG tablet Take 0.5 tablets by mouth daily. 45 tablet 1   • metFORMIN (GLUCOPHAGE) 500 MG tablet Take 1 tablet by mouth 2 times daily (with meals). 180 tablet 1   • GAMMA-AMINOBUTYRIC ACID PO Take  by mouth.     • ranitidine (ZANTAC) 150 MG tablet Take 1 tablet by mouth nightly. 30 tablet 5     No current facility-administered medications for this visit.   Pt called requesting a prescription for Mounjaro 5 mg. Please send to [facility] pharmacy         Past Medical History:   Diagnosis Date   • Diabetes mellitus (CMS/HCC)     borderline   • Diverticulosis of large intestine without diverticulitis    • Essential (primary) hypertension    • Gallstones    • High cholesterol    • Miscarriage    • Thyroid disease        PHYSICAL EXAM:    Visit Vitals  /78 (BP Location: [facility], Patient Position: Sitting, Cuff Size: Regular)   Pulse 66   Temp 96.8 °F (36 °C) (Tympanic)   Resp 16   Ht 4' 7\" (1.397 m)   Wt 73 kg   BMI 37.40 kg/m²       General: Alert, cooperative, conversive, in no acute distress.  Skin:  Warm and dry without rash.    Head:  Normocephalic, atraumatic.   Neck:  Trachea is midline.     Eyes:  Normal conjunctivae and sclerae.   ENT:  Mucous membranes are moist.   Cardiovascular:  Symmetrical pulses.  Regular rate and rhythm without murmur.  Respiratory:   Normal respiratory effort.  Clear to auscultation.  No wheezes, rales or rhonchi.  Gastrointestinal:  Soft and nontender.  Normal bowel sounds.  No hepatomegaly or splenomegaly.   Musculoskeletal:  No deformity or edema.   Back:  Normal alignment.  No costovertebral angle tenderness.  Neurologic:  Oriented times 4.  No focal deficits.  Psychiatric:  Cooperative.  Appropriate mood and affect.    Hemoglobin A1C (%)   Date Value   06/04/2019 10.2 (H)         ASSESSMENT AND PLAN:    1. Type 2 diabetes mellitus with complication, without long-term current use of insulin (CMS/HCC)  - GLYCOHEMOGLOBIN; Future    We congratulated the patient on her successful lifestyle changes and her blood sugar control. We discussed with her the importance of continuing changes in diet and exercise with a   Long term goal of 20 lbs weight loss. We changed her insulin to 16 or 17 units nightly because of the range of her morning sugars and the risk of hypoglycemia. We encouraged her to keep taking her insulin and metformin as well as monitoring her glucose. We discussed the possibility of B12 and folate supplementation but  will address it at a future appointment. We instructed the patient to get labs drawn in the very near future so that we can have a current A1C. We encouraged her to follow up in 3 months.  2) hypertension seems stable   3) hyperlipidemia stable on statin will recheck   I have personally revid skye frye and discussed the plan with patient ad was present throughout the entire exam           Julia Reis, M3

## 2024-04-24 NOTE — TELEPHONE ENCOUNTER
Pt returned our call   He is taking mounjaro 5.0 he is doing fine on it -if they want to increase that ok with him  Please call him back if you have questions  [phone]

## 2024-05-03 ENCOUNTER — TELEPHONE (OUTPATIENT)
Dept: ENDOCRINOLOGY | Facility: CLINIC | Age: 54
End: 2024-05-03
Payer: COMMERCIAL

## 2024-05-03 NOTE — TELEPHONE ENCOUNTER
Pt called back contacted pharmacy ozempic unsure what dose in stock  and wegovy higher dose in stock in stock. Please see previous messages

## 2024-05-03 NOTE — TELEPHONE ENCOUNTER
Left message to return call.  Patient needs to contact pharmacy to see what they do have in stock.

## 2024-05-03 NOTE — TELEPHONE ENCOUNTER
Patient called office, stated that the pharmacies in his area do not have mounjaro. Stated that he is wanting to know if there is an alternative he can take. He would like a call back.

## 2024-05-06 DIAGNOSIS — E11.65 TYPE 2 DIABETES MELLITUS WITH HYPERGLYCEMIA, WITHOUT LONG-TERM CURRENT USE OF INSULIN: Primary | ICD-10-CM

## 2024-05-06 RX ORDER — DULAGLUTIDE 0.75 MG/.5ML
0.75 INJECTION, SOLUTION SUBCUTANEOUS
Qty: 2 ML | Refills: 1 | Status: SHIPPED | OUTPATIENT
Start: 2024-05-06

## 2024-05-06 NOTE — TELEPHONE ENCOUNTER
Changed to trulicity. He didn't do well on ozempic which is similar to wegovy.   Trulicity can be increased monthly as tolerated also. Or change back to mounjaro when able.

## 2024-05-28 ENCOUNTER — OFFICE VISIT (OUTPATIENT)
Dept: ENDOCRINOLOGY | Facility: CLINIC | Age: 54
End: 2024-05-28
Payer: COMMERCIAL

## 2024-05-28 VITALS
WEIGHT: 262 LBS | DIASTOLIC BLOOD PRESSURE: 60 MMHG | HEIGHT: 73 IN | OXYGEN SATURATION: 98 % | SYSTOLIC BLOOD PRESSURE: 116 MMHG | BODY MASS INDEX: 34.72 KG/M2 | HEART RATE: 60 BPM

## 2024-05-28 DIAGNOSIS — E66.09 CLASS 1 OBESITY DUE TO EXCESS CALORIES WITH SERIOUS COMORBIDITY AND BODY MASS INDEX (BMI) OF 34.0 TO 34.9 IN ADULT: ICD-10-CM

## 2024-05-28 DIAGNOSIS — E89.0 POSTOPERATIVE HYPOTHYROIDISM: ICD-10-CM

## 2024-05-28 DIAGNOSIS — E11.65 TYPE 2 DIABETES MELLITUS WITH HYPERGLYCEMIA, WITHOUT LONG-TERM CURRENT USE OF INSULIN: Primary | ICD-10-CM

## 2024-05-28 DIAGNOSIS — E78.2 MIXED HYPERLIPIDEMIA: ICD-10-CM

## 2024-05-28 LAB
ALBUMIN UR-MCNC: <1.2 MG/DL
CREAT UR-MCNC: 131.6 MG/DL
EXPIRATION DATE: ABNORMAL
EXPIRATION DATE: NORMAL
GLUCOSE BLDC GLUCOMTR-MCNC: 84 MG/DL (ref 70–130)
HBA1C MFR BLD: 6.2 % (ref 4.5–5.7)
Lab: ABNORMAL
Lab: NORMAL
MICROALBUMIN/CREAT UR: NORMAL MG/G{CREAT}
T4 FREE SERPL-MCNC: 1.35 NG/DL (ref 0.93–1.7)
TSH SERPL DL<=0.05 MIU/L-ACNC: 1.5 UIU/ML (ref 0.27–4.2)

## 2024-05-28 PROCEDURE — 3078F DIAST BP <80 MM HG: CPT | Performed by: INTERNAL MEDICINE

## 2024-05-28 PROCEDURE — 84443 ASSAY THYROID STIM HORMONE: CPT | Performed by: INTERNAL MEDICINE

## 2024-05-28 PROCEDURE — 3074F SYST BP LT 130 MM HG: CPT | Performed by: INTERNAL MEDICINE

## 2024-05-28 PROCEDURE — 3044F HG A1C LEVEL LT 7.0%: CPT | Performed by: INTERNAL MEDICINE

## 2024-05-28 PROCEDURE — 82043 UR ALBUMIN QUANTITATIVE: CPT | Performed by: INTERNAL MEDICINE

## 2024-05-28 PROCEDURE — 83036 HEMOGLOBIN GLYCOSYLATED A1C: CPT | Performed by: INTERNAL MEDICINE

## 2024-05-28 PROCEDURE — 99214 OFFICE O/P EST MOD 30 MIN: CPT | Performed by: INTERNAL MEDICINE

## 2024-05-28 PROCEDURE — 1160F RVW MEDS BY RX/DR IN RCRD: CPT | Performed by: INTERNAL MEDICINE

## 2024-05-28 PROCEDURE — 82947 ASSAY GLUCOSE BLOOD QUANT: CPT | Performed by: INTERNAL MEDICINE

## 2024-05-28 PROCEDURE — 1159F MED LIST DOCD IN RCRD: CPT | Performed by: INTERNAL MEDICINE

## 2024-05-28 PROCEDURE — 84439 ASSAY OF FREE THYROXINE: CPT | Performed by: INTERNAL MEDICINE

## 2024-05-28 PROCEDURE — 82570 ASSAY OF URINE CREATININE: CPT | Performed by: INTERNAL MEDICINE

## 2024-05-28 RX ORDER — METFORMIN HYDROCHLORIDE 500 MG/1
1000 TABLET, EXTENDED RELEASE ORAL 2 TIMES DAILY
Qty: 360 TABLET | Refills: 1 | Status: SHIPPED | OUTPATIENT
Start: 2024-05-28

## 2024-05-28 RX ORDER — ROSUVASTATIN CALCIUM 40 MG/1
40 TABLET, COATED ORAL DAILY
Qty: 90 TABLET | Refills: 3 | Status: SHIPPED | OUTPATIENT
Start: 2024-05-28

## 2024-05-28 RX ORDER — DULAGLUTIDE 0.75 MG/.5ML
0.75 INJECTION, SOLUTION SUBCUTANEOUS
Qty: 6 ML | Refills: 1 | Status: SHIPPED | OUTPATIENT
Start: 2024-05-28

## 2024-05-28 NOTE — PATIENT INSTRUCTIONS
Patients with diabetes should have a yearly eye exam. Please be sure to schedule this at least once yearly and have the eye exam report faxed to [phone].

## 2024-05-28 NOTE — PROGRESS NOTES
"Chief Complaint   Patient presents with    Diabetes     Type II          HPI   Cameron Courtney is a 53 y.o. male had concerns including Diabetes (Type II).    He is checking blood sugars occasionally times per day. BGs in the low 100s.  Current medications for diabetes include metformin 1000 mg twice daily, Jardiance 25 mg daily, Trulicity 0.75 mg weekly.  He was on Mounjaro but due to supply issues about a month ago was changed to Trulicity.  Likes Trulicity better than mounjaro.     Weight is down 16 pounds since his last visit here.    Anxiety mildly increased.   On levothyroxine 150 mcg daily.     The following portions of the patient's history were reviewed and updated as appropriate: allergies, current medications, past family history, past medical history, past social history, past surgical history, and problem list.      Review of Systems   Constitutional: Negative.  Positive for unexpected weight loss.   Endocrine: Negative.         Physical Exam  Vitals reviewed.   Constitutional:       Appearance: Normal appearance. He is obese.      Comments: Body mass index is 34.57 kg/m².   Cardiovascular:      Rate and Rhythm: Normal rate.   Pulmonary:      Effort: Pulmonary effort is normal.   Neurological:      General: No focal deficit present.      Mental Status: He is alert. Mental status is at baseline.   Psychiatric:         Mood and Affect: Mood normal.         Behavior: Behavior normal.        /60 (BP Location: Left arm, Patient Position: Sitting, Cuff Size: Adult)   Pulse 60   Ht 185.4 cm (73\")   Wt 119 kg (262 lb)   SpO2 98%   BMI 34.57 kg/m²      Labs and imaging    CMP:  Lab Results   Component Value Date    BUN 21 (H) 08/14/2023    CREATININE 1.11 08/14/2023    EGFR 79.4 08/14/2023    BCR 18.9 08/14/2023     08/14/2023    K 4.3 08/14/2023    CO2 27.0 08/14/2023    CALCIUM 9.5 08/14/2023    ALBUMIN 4.4 08/14/2023    BILITOT 0.4 08/14/2023    ALKPHOS 88 08/14/2023    AST 21 08/14/2023    " ALT 29 08/14/2023     Lipid Panel:  Lab Results   Component Value Date    CHOL 152 08/14/2023    TRIG 179 (H) 08/14/2023    HDL 41 08/14/2023    VLDL 31 08/14/2023    LDL 80 08/14/2023     HbA1c:  Lab Results   Component Value Date    HGBA1C 6.2 (A) 05/28/2024    HGBA1C 8.0 (A) 12/28/2023    HGBA1C 7.2 08/14/2023    HGBA1C 7.2 04/27/2023    HGBA1C 7.1 12/27/2022    HGBA1C 6.8 08/25/2022    HGBA1C 7.3 04/18/2022    HGBA1C 6.9 09/27/2021    HGBA1C 7.7 06/16/2021    HGBA1C 6.4 03/11/2021    HGBA1C 8.5 09/21/2020    HGBA1C 7.6 06/18/2020     Glucose:  Lab Results   Component Value Date    POCGLU 84 05/28/2024     Microalbumin:  Lab Results   Component Value Date    MALBCRERATIO  04/27/2023      Comment:      Unable to calculate     TSH:  Lab Results   Component Value Date    TSH 3.020 08/14/2023       Assessment and plan  Diagnoses and all orders for this visit:    1. Type 2 diabetes mellitus without complication, without long-term current use of insulin (Primary)  Controlled with A1c down to 6.2.  No complications from diabetes.  Continue metformin 1000 mg twice daily, Jardiance 25 mg daily, Trulicity 0.75 mg weekly.  Increased dose and Trulicity is not necessary at this time.  History of intolerance to Ozempic.  Feels better on Trulicity than he did on doses of Mounjaro.  Check BGs regularly and call the office if trending up for dose increase in Trulicity.  Labs are up-to-date from August.  Urine microalbumin to be checked today.  Monofilament up-to-date from August. Ophtho exam should be updated yearly.    -     POC Glucose, Blood  -     POC Glycosylated Hemoglobin (Hb A1C)  -     metFORMIN ER (GLUCOPHAGE-XR) 500 MG 24 hr tablet; Take 2 tablets by mouth 2 (Two) Times a Day.  Dispense: 360 tablet; Refill: 1  -     empagliflozin (Jardiance) 25 MG tablet tablet; Take 1 tablet by mouth Daily.  Dispense: 90 tablet; Refill: 1  -     Dulaglutide (Trulicity) 0.75 MG/0.5ML solution pen-injector; Inject 0.75 mg under the skin  into the appropriate area as directed Every 7 (Seven) Days.  Dispense: 6 mL; Refill: 1  -     Microalbumin / Creatinine Urine Ratio - Urine, Clean Catch    2. Class 1 obesity due to excess calories with serious comorbidity and body mass index (BMI) of 34.0 to 34.9 in adult  BMI down to 34.6 with a 16 pound weight loss since December.  Congratulated on weight loss.  Continue Trulicity as above and titrate up if needed.    3. Postoperative hypothyroidism  Clinically euthyroid on levothyroxine 150 mcg daily.  Repeat TFTs.  With significant weight loss, dose reductions may be necessary.  Notes mild increase in anxiety.    -     T4, Free  -     TSH    4. Mixed hyperlipidemia  On rosuvastatin 40 mg daily.  Lipids up-to-date from August.  Refill sent today.  -     rosuvastatin (CRESTOR) 40 MG tablet; Take 1 tablet by mouth Daily.  Dispense: 90 tablet; Refill: 3         Return in about 4 months (around 9/28/2024) for next scheduled follow up. The patient was instructed to contact the clinic with any interval questions or concerns.    Electronically signed by: Juli Ely DO   Endocrinologist    Please note that portions of this note were completed with a voice recognition program.

## 2024-09-03 DIAGNOSIS — E89.0 POSTOPERATIVE HYPOTHYROIDISM: ICD-10-CM

## 2024-09-04 RX ORDER — LEVOTHYROXINE SODIUM 150 UG/1
TABLET ORAL
Qty: 90 TABLET | Refills: 2 | Status: SHIPPED | OUTPATIENT
Start: 2024-09-04

## 2024-11-19 ENCOUNTER — OFFICE VISIT (OUTPATIENT)
Dept: ENDOCRINOLOGY | Facility: CLINIC | Age: 54
End: 2024-11-19
Payer: COMMERCIAL

## 2024-11-19 VITALS
HEIGHT: 73 IN | WEIGHT: 270.4 LBS | SYSTOLIC BLOOD PRESSURE: 124 MMHG | HEART RATE: 69 BPM | DIASTOLIC BLOOD PRESSURE: 86 MMHG | BODY MASS INDEX: 35.84 KG/M2 | OXYGEN SATURATION: 95 %

## 2024-11-19 DIAGNOSIS — E11.42 TYPE 2 DIABETES MELLITUS WITH DIABETIC POLYNEUROPATHY, WITHOUT LONG-TERM CURRENT USE OF INSULIN: Primary | ICD-10-CM

## 2024-11-19 DIAGNOSIS — E89.0 POSTOPERATIVE HYPOTHYROIDISM: ICD-10-CM

## 2024-11-19 DIAGNOSIS — E78.2 MIXED HYPERLIPIDEMIA: ICD-10-CM

## 2024-11-19 LAB
EXPIRATION DATE: ABNORMAL
EXPIRATION DATE: ABNORMAL
GLUCOSE BLDC GLUCOMTR-MCNC: 138 MG/DL (ref 70–130)
HBA1C MFR BLD: 6.5 % (ref 4.5–5.7)
Lab: ABNORMAL
Lab: ABNORMAL

## 2024-11-19 PROCEDURE — 3044F HG A1C LEVEL LT 7.0%: CPT | Performed by: INTERNAL MEDICINE

## 2024-11-19 PROCEDURE — 83036 HEMOGLOBIN GLYCOSYLATED A1C: CPT | Performed by: INTERNAL MEDICINE

## 2024-11-19 PROCEDURE — 82947 ASSAY GLUCOSE BLOOD QUANT: CPT | Performed by: INTERNAL MEDICINE

## 2024-11-19 PROCEDURE — 3074F SYST BP LT 130 MM HG: CPT | Performed by: INTERNAL MEDICINE

## 2024-11-19 PROCEDURE — 1159F MED LIST DOCD IN RCRD: CPT | Performed by: INTERNAL MEDICINE

## 2024-11-19 PROCEDURE — 3079F DIAST BP 80-89 MM HG: CPT | Performed by: INTERNAL MEDICINE

## 2024-11-19 PROCEDURE — 1160F RVW MEDS BY RX/DR IN RCRD: CPT | Performed by: INTERNAL MEDICINE

## 2024-11-19 PROCEDURE — 99214 OFFICE O/P EST MOD 30 MIN: CPT | Performed by: INTERNAL MEDICINE

## 2024-11-19 RX ORDER — METFORMIN HYDROCHLORIDE 500 MG/1
1000 TABLET, EXTENDED RELEASE ORAL 2 TIMES DAILY
Qty: 360 TABLET | Refills: 3 | Status: SHIPPED | OUTPATIENT
Start: 2024-11-19

## 2024-11-19 RX ORDER — DULAGLUTIDE 1.5 MG/.5ML
1.5 INJECTION, SOLUTION SUBCUTANEOUS
Qty: 6 ML | Refills: 3 | Status: SHIPPED | OUTPATIENT
Start: 2024-11-19

## 2024-11-19 NOTE — PROGRESS NOTES
"Chief Complaint   Patient presents with    Diabetes     Type 2           HPI   Cameron Courtney is a 54 y.o. male had concerns including Diabetes (Type 2 ).    He is checking blood sugars rarely.  Current medications for diabetes include metformin 1000 mg twice daily, Trulicity 0.75 mg weekly, Jardiance 25 mg daily.    A1c 6.5 today. Weight is trending up about 8 lb since his last visit.   Is interested in a dose increase in trulicity.     The following portions of the patient's history were reviewed and updated as appropriate: allergies, current medications, past family history, past medical history, past social history, past surgical history, and problem list.      Review of Systems   Constitutional:  Positive for unexpected weight gain.   Endocrine: Negative.         Physical Exam  Vitals reviewed.   Constitutional:       Appearance: Normal appearance. He is obese.      Comments: Body mass index is 35.67 kg/m².   Cardiovascular:      Rate and Rhythm: Normal rate.      Pulses:           Dorsalis pedis pulses are 1+ on the right side and 1+ on the left side.   Pulmonary:      Effort: Pulmonary effort is normal.   Feet:      Right foot:      Skin integrity: Dry skin present.      Toenail Condition: Right toenails are normal.      Left foot:      Skin integrity: Dry skin present.      Toenail Condition: Fungal disease present.     Comments: Diabetic Foot Exam Performed and Monofilament Test Performed      Monofilament 5/5 bilaterally, diminished bilateral medial ball of foot    Neurological:      General: No focal deficit present.      Mental Status: He is alert. Mental status is at baseline.   Psychiatric:         Mood and Affect: Mood normal.         Behavior: Behavior normal.        /86 (BP Location: Right arm, Patient Position: Sitting)   Pulse 69   Ht 185.4 cm (73\")   Wt 123 kg (270 lb 6.4 oz)   SpO2 95%   BMI 35.67 kg/m²      Labs and imaging    A1C:  Lab Results   Component Value Date    HGBA1C 6.5 " (A) 11/19/2024    HGBA1C 6.2 (A) 05/28/2024      Glucose:  Lab Results   Component Value Date    POCGLU 138 (A) 11/19/2024      CMP:  Lab Results   Component Value Date    GLUCOSE 142 (H) 08/14/2023    BUN 21 (H) 08/14/2023    CREATININE 1.11 08/14/2023     08/14/2023    K 4.3 08/14/2023     08/14/2023    CALCIUM 9.5 08/14/2023    PROTEINTOT 6.9 08/14/2023    ALBUMIN 4.4 08/14/2023    ALT 29 08/14/2023    AST 21 08/14/2023    ALKPHOS 88 08/14/2023    BILITOT 0.4 08/14/2023    GLOB 2.5 08/14/2023    AGRATIO 1.8 08/14/2023    BCR 18.9 08/14/2023    ANIONGAP 7.0 08/14/2023    EGFR 79.4 08/14/2023      Lipid Panel:  Lab Results   Component Value Date    CHOL 152 08/14/2023    TRIG 179 (H) 08/14/2023    HDL 41 08/14/2023    LDL 80 08/14/2023      Urine Microalbumin:  Lab Results   Component Value Date    MALBCRERATIO  05/28/2024      Comment:      Unable to calculate      TSH:  Lab Results   Component Value Date    TSH 1.500 05/28/2024 12/1/2020 CMP with creatinine 1.14, GFR 74, AST 57, ALT 92, total cholesterol 172, triglycerides 157, HDL 40, , TSH 10.8, random urine albumin 17.6     3/11/2021 CMP with creatinine 1.16, GFR 73, AST 43 with an upper limit of 40, ALT 62 with an upper limit of 44     7/5/2021 TSH 6.95, free T4 1.19, CMP with glucose 121, creatinine 1.2, GFR 70, potassium 5.6, calcium 10.3 with an albumin of 4.6, ALT 54, AST and alkaline phosphatase normal, , HDL 36, triglycerides 142, total cholesterol 169, urine albumin to creatinine ratio normal     8/30/21 TSH 4.98, free T4 1.10, Cr 1.06, GFR > 60     Assessment and plan  Diagnoses and all orders for this visit:    1. Type 2 diabetes mellitus with diabetic polyneuropathy, without long-term current use of insulin (Primary)  Controlled.  A1c trending up 6.5.  Complicated by neuropathy.  Continue metformin 1000 mg twice daily, Jardiance 25 mg daily.  Increase Trulicity to 1.5 mg weekly.  He prefers this over Ozempic and  Mounjaro.  Monofilament updated today.  Ophtho exam up-to-date from 11/4/2024, no retinopathy. Lipid and CMP are due.  Urine microalbumin up-to-date from May.  -     POC Glucose, Blood  -     POC Glycosylated Hemoglobin (Hb A1C)  -     Dulaglutide (Trulicity) 1.5 MG/0.5ML solution auto-injector; Inject 1.5 mg under the skin into the appropriate area as directed Every 7 (Seven) Days.  Dispense: 6 mL; Refill: 3  -     metFORMIN ER (GLUCOPHAGE-XR) 500 MG 24 hr tablet; Take 2 tablets by mouth 2 (Two) Times a Day.  Dispense: 360 tablet; Refill: 3  -     empagliflozin (Jardiance) 25 MG tablet tablet; Take 1 tablet by mouth Daily.  Dispense: 90 tablet; Refill: 3    2. Postoperative hypothyroidism  Status post GALLARDO for Graves' disease and underwent thyroidectomy 8/4/2020 for atypical thyroid nodule. Final pathology was benign.  Euthyroid on levothyroxine 150 mcg daily.  TSH normal in May.  Monitor yearly.      3. Mixed hyperlipidemia  On rosuvastatin 40 mg daily.  Due for fasting lipids.  Printed order was given to the patient today.  Monitor yearly.  -     Comprehensive Metabolic Panel; Future  -     Lipid Panel; Future         Return in about 6 months (around 5/19/2025) for next scheduled follow up. The patient was instructed to contact the clinic with any interval questions or concerns.    Electronically signed by: Juli Ely DO   Endocrinologist    Please note that portions of this note were completed with a voice recognition program.

## 2025-05-20 ENCOUNTER — OFFICE VISIT (OUTPATIENT)
Dept: ENDOCRINOLOGY | Facility: CLINIC | Age: 55
End: 2025-05-20
Payer: COMMERCIAL

## 2025-05-20 VITALS
HEIGHT: 73 IN | WEIGHT: 267 LBS | OXYGEN SATURATION: 98 % | BODY MASS INDEX: 35.39 KG/M2 | SYSTOLIC BLOOD PRESSURE: 108 MMHG | HEART RATE: 62 BPM | DIASTOLIC BLOOD PRESSURE: 66 MMHG

## 2025-05-20 DIAGNOSIS — E89.0 POSTOPERATIVE HYPOTHYROIDISM: ICD-10-CM

## 2025-05-20 DIAGNOSIS — E78.2 MIXED HYPERLIPIDEMIA: ICD-10-CM

## 2025-05-20 DIAGNOSIS — E11.42 TYPE 2 DIABETES MELLITUS WITH DIABETIC POLYNEUROPATHY, WITHOUT LONG-TERM CURRENT USE OF INSULIN: Primary | ICD-10-CM

## 2025-05-20 LAB
EXPIRATION DATE: ABNORMAL
EXPIRATION DATE: NORMAL
GLUCOSE BLDC GLUCOMTR-MCNC: 111 MG/DL (ref 70–130)
HBA1C MFR BLD: 6.7 % (ref 4.5–5.7)
Lab: ABNORMAL
Lab: NORMAL

## 2025-05-20 RX ORDER — DULAGLUTIDE 3 MG/.5ML
3 INJECTION, SOLUTION SUBCUTANEOUS
Qty: 2 ML | Refills: 5 | Status: SHIPPED | OUTPATIENT
Start: 2025-05-20

## 2025-05-20 RX ORDER — METFORMIN HYDROCHLORIDE 500 MG/1
1000 TABLET, EXTENDED RELEASE ORAL 2 TIMES DAILY
Qty: 360 TABLET | Refills: 3 | Status: SHIPPED | OUTPATIENT
Start: 2025-05-20

## 2025-05-20 RX ORDER — ROSUVASTATIN CALCIUM 40 MG/1
40 TABLET, COATED ORAL DAILY
Qty: 90 TABLET | Refills: 3 | Status: SHIPPED | OUTPATIENT
Start: 2025-05-20

## 2025-05-20 NOTE — PROGRESS NOTES
Chief Complaint   Patient presents with    Diabetes     Type 2 diabetes mellitus with diabetic polyneuropathy, without long-term current use of insulin    Hypertension    Hypothyroidism    Hyperlipidemia          HPI   Cameron Courtney is a 54 y.o. male had concerns including Diabetes (Type 2 diabetes mellitus with diabetic polyneuropathy, without long-term current use of insulin), Hypertension, Hypothyroidism, and Hyperlipidemia.    He is checking blood sugars infrequently.  Current medications for diabetes include metformin 1000 mg twice daily, Jardiance 25 mg daily, Trulicity 1.5 mg weekly.    Trulicity was increased after his last visit.  Weight is down about 3 pounds. A1c up to 6.7. Tolerating trulicity without issue. Ok with dose increase.     Takes levothyroxine 150 mcg daily and rosuvastatin 40 mg daily.  Due for labs.    The following portions of the patient's history were reviewed and updated as appropriate: allergies, current medications, past family history, past medical history, past social history, past surgical history, and problem list.      Review of Systems   Constitutional: Negative.    Endocrine: Negative.         Physical Exam  Vitals reviewed.   Constitutional:       Appearance: Normal appearance. He is obese.      Comments: Body mass index is 35.23 kg/m².     Cardiovascular:      Rate and Rhythm: Normal rate.      Pulses:           Dorsalis pedis pulses are 1+ on the right side and 1+ on the left side.   Pulmonary:      Effort: Pulmonary effort is normal.   Feet:      Right foot:      Skin integrity: Skin integrity normal.      Toenail Condition: Right toenails are abnormally thick.      Left foot:      Skin integrity: Skin integrity normal.      Toenail Condition: Left toenails are abnormally thick.      Comments: Diabetic Foot Exam Performed and Monofilament Test Performed      Monofilament 5/5 bilaterally but diminished diffusely    Neurological:      General: No focal deficit present.       "Mental Status: He is alert. Mental status is at baseline.   Psychiatric:         Mood and Affect: Mood normal.         Behavior: Behavior normal.        /66 (BP Location: Left arm, Patient Position: Sitting, Cuff Size: Adult)   Pulse 62   Ht 185.4 cm (73\")   Wt 121 kg (267 lb)   SpO2 98%   BMI 35.23 kg/m²      Labs and imaging    A1C:  Lab Results   Component Value Date    HGBA1C 6.7 (A) 05/20/2025    HGBA1C 6.5 (A) 11/19/2024      Glucose:  Lab Results   Component Value Date    POCGLU 111 05/20/2025      CMP:  Lab Results   Component Value Date    GLUCOSE 142 (H) 08/14/2023    BUN 21 (H) 08/14/2023    CREATININE 1.11 08/14/2023     08/14/2023    K 4.3 08/14/2023     08/14/2023    CALCIUM 9.5 08/14/2023    PROTEINTOT 6.9 08/14/2023    ALBUMIN 4.4 08/14/2023    ALT 29 08/14/2023    AST 21 08/14/2023    ALKPHOS 88 08/14/2023    BILITOT 0.4 08/14/2023    GLOB 2.5 08/14/2023    AGRATIO 1.8 08/14/2023    BCR 18.9 08/14/2023    ANIONGAP 7.0 08/14/2023    EGFR 79.4 08/14/2023      Lipid Panel:  Lab Results   Component Value Date    CHOL 152 08/14/2023    TRIG 179 (H) 08/14/2023    HDL 41 08/14/2023    LDL 80 08/14/2023      Urine Microalbumin:  Lab Results   Component Value Date    MALBCRERATIO  05/28/2024      Comment:      Unable to calculate      TSH:  Lab Results   Component Value Date    TSH 1.500 05/28/2024 12/1/2020 CMP with creatinine 1.14, GFR 74, AST 57, ALT 92, total cholesterol 172, triglycerides 157, HDL 40, , TSH 10.8, random urine albumin 17.6     3/11/2021 CMP with creatinine 1.16, GFR 73, AST 43 with an upper limit of 40, ALT 62 with an upper limit of 44     7/5/2021 TSH 6.95, free T4 1.19, CMP with glucose 121, creatinine 1.2, GFR 70, potassium 5.6, calcium 10.3 with an albumin of 4.6, ALT 54, AST and alkaline phosphatase normal, , HDL 36, triglycerides 142, total cholesterol 169, urine albumin to creatinine ratio normal     8/30/21 TSH 4.98, free T4 1.10, Cr " 1.06, GFR > 60    Assessment and plan  Diagnoses and all orders for this visit:    1. Type 2 diabetes mellitus with diabetic polyneuropathy, without long-term current use of insulin (Primary)  Controlled but A1c trending up 6.7.  Complicated by neuropathy.    Continue metformin 1000 mg twice daily, Jardiance 25 mg daily.  Increase Trulicity to 3 mg weekly.  Has preferred this over transition to Ozempic and Mounjaro.  Monofilament updated today.  Ophtho exam up-to-date from 11/4/2024, no retinopathy. Labs are due. Printed order given to check when fasting.   -     POC Glucose, Blood  -     POC Glycosylated Hemoglobin (Hb A1C)  -     Dulaglutide (Trulicity) 3 MG/0.5ML solution auto-injector; Inject 3 mg under the skin into the appropriate area as directed Every 7 (Seven) Days.  Dispense: 2 mL; Refill: 5  -     Comprehensive Metabolic Panel; Future  -     Microalbumin / Creatinine Urine Ratio - Urine, Clean Catch; Future  -     empagliflozin (Jardiance) 25 MG tablet tablet; Take 1 tablet by mouth Daily.  Dispense: 90 tablet; Refill: 3  -     metFORMIN ER (GLUCOPHAGE-XR) 500 MG 24 hr tablet; Take 2 tablets by mouth 2 (Two) Times a Day.  Dispense: 360 tablet; Refill: 3    2. Postoperative hypothyroidism  Status post GALLARDO for Graves' disease and underwent thyroidectomy 8/4/2020 for atypical thyroid nodule. Final pathology was benign.  Euthyroid on levothyroxine 150 mcg daily.  TSH normal in May.  Monitor yearly.    -     TSH; Future    3. Mixed hyperlipidemia  On rosuvastatin. Refill sent.  Check fasting lipids.  -     Lipid Panel; Future  -     rosuvastatin (CRESTOR) 40 MG tablet; Take 1 tablet by mouth Daily.  Dispense: 90 tablet; Refill: 3      Return in about 6 months (around 11/20/2025) for next scheduled follow up. The patient was instructed to contact the clinic with any interval questions or concerns.    Electronically signed by: Juli Ely DO   Endocrinologist    Please note that portions of this note were  completed with a voice recognition program.

## 2025-06-02 DIAGNOSIS — E89.0 POSTOPERATIVE HYPOTHYROIDISM: ICD-10-CM

## 2025-06-03 DIAGNOSIS — E89.0 POSTOPERATIVE HYPOTHYROIDISM: ICD-10-CM

## 2025-06-03 RX ORDER — LEVOTHYROXINE SODIUM 150 UG/1
150 TABLET ORAL DAILY
Qty: 90 TABLET | Refills: 1 | Status: CANCELLED | OUTPATIENT
Start: 2025-06-03

## 2025-06-03 NOTE — TELEPHONE ENCOUNTER
Rx Refill Note  Requested Prescriptions     Pending Prescriptions Disp Refills    levothyroxine (SYNTHROID, LEVOTHROID) 150 MCG tablet [Pharmacy Med Name: Levothyroxine Sodium 150 MCG Oral Tablet] 90 tablet 0     Sig: Take 1 tablet by mouth once daily      Last office visit with prescribing clinician: 5/20/2025     Next office visit with prescribing clinician: 12/4/2025     Gwendolyn Aceves MA  06/03/25, 09:08 EDT

## 2025-06-04 RX ORDER — LEVOTHYROXINE SODIUM 150 UG/1
150 TABLET ORAL DAILY
Qty: 90 TABLET | Refills: 1 | Status: SHIPPED | OUTPATIENT
Start: 2025-06-04

## 2025-06-17 ENCOUNTER — PRIOR AUTHORIZATION (OUTPATIENT)
Dept: ENDOCRINOLOGY | Facility: CLINIC | Age: 55
End: 2025-06-17
Payer: COMMERCIAL

## 2025-06-17 NOTE — TELEPHONE ENCOUNTER
Cameron Courtney (Key: [id number])  PA Case ID #: [id number]  Rx #: [id number]  Need Help? Call us at [phone]  Outcome  Approved today by [place] [employer] MedImpact 2017  The request has been approved. The authorization is effective from 06/17/2025 to 12/16/2025, as long as the member is enrolled in their current health plan. The request was approved as submitted. A written notification letter will follow with additional details.  Effective Date: 6/17/2025  Authorization Expiration Date: 12/16/2025  Drug  Trulicity 3MG/0.5ML auto-injectors  ePA cloud logo  Form  Med[employer] [place] Medicaid ePA Form 2017 NCP[place]